# Patient Record
Sex: FEMALE | Race: WHITE | NOT HISPANIC OR LATINO | Employment: FULL TIME | URBAN - METROPOLITAN AREA
[De-identification: names, ages, dates, MRNs, and addresses within clinical notes are randomized per-mention and may not be internally consistent; named-entity substitution may affect disease eponyms.]

---

## 2016-08-09 LAB
EXTERNAL ABO GROUPING: NORMAL
EXTERNAL ANTIBODY SCREEN: NEGATIVE
EXTERNAL HEPATITIS B SURFACE ANTIGEN: NEGATIVE
EXTERNAL RH FACTOR: POSITIVE
EXTERNAL RUBELLA QUALITATIVE: NORMAL
EXTERNAL SYPHILIS RPR SCREEN: NORMAL
HIV1 AB SPEC QL IA.RAPID: NEGATIVE

## 2017-01-22 ENCOUNTER — HOSPITAL ENCOUNTER (OUTPATIENT)
Facility: HOSPITAL | Age: 42
Setting detail: OBSERVATION
Discharge: HOME | End: 2017-01-22
Attending: OBSTETRICS & GYNECOLOGY | Admitting: OBSTETRICS & GYNECOLOGY

## 2017-01-22 VITALS
BODY MASS INDEX: 32.76 KG/M2 | WEIGHT: 178 LBS | DIASTOLIC BLOOD PRESSURE: 77 MMHG | HEART RATE: 69 BPM | SYSTOLIC BLOOD PRESSURE: 127 MMHG | TEMPERATURE: 99 F | HEIGHT: 62 IN | RESPIRATION RATE: 20 BRPM | OXYGEN SATURATION: 97 %

## 2017-01-22 LAB
EXPIRATION DATE: NORMAL
Lab: NORMAL
PROT UR STRIP-MCNC: NEGATIVE MG/DL

## 2017-01-22 PROCEDURE — G0378 HOSPITAL OBSERVATION PER HR: HCPCS

## 2017-01-22 PROCEDURE — 96372 THER/PROPH/DIAG INJ SC/IM: CPT

## 2017-01-22 PROCEDURE — 25010000002 BETAMETHASONE ACET & SOD PHOS PER 4 MG: Performed by: OBSTETRICS & GYNECOLOGY

## 2017-01-22 PROCEDURE — 59025 FETAL NON-STRESS TEST: CPT

## 2017-01-22 RX ORDER — MAGNESIUM GLUCONATE 27 MG(500)
400 TABLET ORAL DAILY
COMMUNITY
End: 2017-01-27 | Stop reason: HOSPADM

## 2017-01-22 RX ORDER — MELATONIN
2000 DAILY
COMMUNITY

## 2017-01-22 RX ORDER — BETAMETHASONE SODIUM PHOSPHATE AND BETAMETHASONE ACETATE 3; 3 MG/ML; MG/ML
12 INJECTION, SUSPENSION INTRA-ARTICULAR; INTRALESIONAL; INTRAMUSCULAR; SOFT TISSUE ONCE
Status: COMPLETED | OUTPATIENT
Start: 2017-01-22 | End: 2017-01-22

## 2017-01-22 RX ORDER — URSODIOL 250 MG/1
500 TABLET, FILM COATED ORAL 3 TIMES DAILY
COMMUNITY

## 2017-01-22 RX ADMIN — BETAMETHASONE SODIUM PHOSPHATE AND BETAMETHASONE ACETATE 12 MG: 3; 3 INJECTION, SUSPENSION INTRA-ARTICULAR; INTRALESIONAL; INTRAMUSCULAR at 13:38

## 2017-01-22 NOTE — IP AVS SNAPSHOT
AFTER VISIT SUMMARY             Khadra Royal           About your hospitalization     You were discharged on:  January 22, 2017 You last received care in the:  [facility] LABOR DELIVERY     Unit phone number:  [phone]       Medications    If you or your caregiver advised us that you are currently taking a medication and that medication is marked below as “Resume”, this simply indicates that we have reviewed those medications to make sure our new therapy recommendations do not interfere.  If you have concerns about medications other than those new ones which we are prescribing today, please consult the physician who prescribed them (or your primary physician).  Our review of your home medications is not meant to indicate that we are directing their use.             Your Medications      ASK your doctor about these medications           Morning Noon Evening Bedtime As Needed    aspirin 81 MG chewable tablet   Chew 81 mg Daily.                                cholecalciferol 1000 UNITS tablet   Take 2,000 Units by mouth Daily.   Commonly known as:  VITAMIN D3                                DHA ALGAL-900 300 MG capsule   Take  by mouth.   Generic drug:  Docosahexaenoic Acid                                docusate sodium 100 MG capsule   Take 100 mg by mouth 2 (Two) Times a Day.   Commonly known as:  COLACE                                * insulin detemir 100 UNIT/ML injection   Inject 20 Units under the skin Daily.   Commonly known as:  LEVEMIR                                * insulin detemir 100 UNIT/ML injection   Inject 50 Units under the skin Every Night.   Commonly known as:  LEVEMIR                                magnesium gluconate 500 MG tablet   Take 400 mg by mouth Daily.   Commonly known as:  MAGONATE                                prenatal (CLASSIC) vitamin 28-0.8 MG tablet tablet   Take 1 tablet by mouth Daily.                                ursodiol 250 MG tablet   Take 500 mg by  mouth 3 (Three) Times a Day.   Commonly known as:  ACTIGALL                                * Notice:  This list has 2 medication(s) that are the same as other medications prescribed for you. Read the directions carefully, and ask your doctor or other care provider to review them with you.             Instructions for After Discharge        Scheduled Appointments     Jan 25, 2017  7:00 AM EST   LABOR INDUCTION with BLAINE LD INDUCTION ROOM   [facility] LABOR AND DELIVERY ([place])    [address] [facility] [id number] [phone]               Relevant Prenatal Information          Facts About Your Prenatal Visit (All Dating Information Is Approximate)     Due Date How Far Along Am I?          2/18/2017 36 weeks 1 days        Vitals     Blood Pressure                   131/72            Summary of Your Hospitalization        Reason for Hospitalization     Your primary diagnosis was:  Not on File    Your diagnoses also included:  Pregnancy      Care Providers     Provider Service Role Specialty    Krystin Acosta MD -- Attending Provider Obstetrics and Gynecology      Your Allergies  Date Reviewed: 1/22/2017    No active allergies      [employer] Signup     Our records indicate that you have an active [employer] account.    You can view your After Visit Summary by going to [employer] and logging in with your [employer] username and password.  If you don't have a [employer] username and password but a parent or guardian has access to your record, the parent or guardian should login with their own [employer] username and password and access your record to view the After Visit Summary.    If you have questions, you can email [email or web address] or call [phone] to talk to our [employer] staff.  Remember, [employer] is NOT to be used for urgent needs.  For medical emergencies, dial 911.          Information Regarding Fetal Kick Counts     Fetal Movement Counts  Patient Name:  __________________________________________________   Patient Due Date: ____________________  Performing a fetal movement count is highly recommended in high-risk pregnancies, but it is good for every pregnant woman to do. Your caregiver may ask you to start counting fetal movements at 28 weeks of the pregnancy. Fetal movements often increase:  · After eating a full meal.  · After physical activity.  · After eating or drinking something sweet or cold.  · At rest.  Pay attention to when you feel the baby is most active. This will help you notice a pattern of your baby's sleep and wake cycles and what factors contribute to an increase in fetal movement. It is important to perform a fetal movement count at the same time each day when your baby is normally most active.    HOW TO COUNT FETAL MOVEMENTS  1. Find a quiet and comfortable area to sit or lie down on your left side. Lying on your left side provides the best blood and oxygen circulation to your baby.  2. Write down the day and time on a sheet of paper or in a journal.  3. Start counting kicks, flutters, swishes, rolls, or jabs in a 2 hour period. You should feel at least 10 movements within 2 hours.  4. If you do not feel 10 movements in 2 hours, wait 2-3 hours and count again. Look for a change in the pattern or not enough counts in 2 hours.  SEEK MEDICAL CARE IF:  · You feel less than 10 counts in 2 hours, tried twice.  · There is no movement in over an hour.  · The pattern is changing or taking longer each day to reach 10 counts in 2 hours.  · You feel the baby is not moving as he or she usually does.    Date  Movements  Start Time  Finish Time    Date  Movements  Start Time  Finish Time    Date  Movements  Start Time  Finish Time    Date  Movements  Start Time  Finish Time    Date  Movements  Start Time  Finish Time    Date  Movements  Start Time  Finish Time      Date  Movements  Start Time  Finish Time    Date  Movements  Start Time  Finish Time    Date   Movements  Start Time  Finish Time    Date  Movements  Start Time  Finish Time    Date  Movements  Start Time  Finish Time    Date  Movements  Start Time  Finish Time      Date  Movements  Start Time  Finish Time    Date  Movements  Start Time  Finish Time    Date  Movements  Start Time  Finish Time    Date  Movements  Start Time  Finish Time    Date  Movements  Start Time  Finish Time    Date  Movements  Start Time  Finish Time    Date  Movements  Start Time  Finish Time    Date  Movements  Start Time  Finish Time    Date  Movements  Start Time  Finish Time    Date  Movements  Start Time  Finish Time    Date  Movements  Start Time  Finish Time    Date  Movements  Start Time  Finish Time      Document Released: 01/17/2008   Document Revised: 12/04/2013   Document Reviewed: 10/14/2013    ExitCare® Patient Information ©2015 [employer], [facility]. This information is not intended to replace advice given to you by your health care provider. Make sure you discuss any questions you have with your health care provider.            More Information      Third Trimester of Pregnancy  The third trimester is from week 29 through week 42, months 7 through 9. The third trimester is a time when the fetus is growing rapidly. At the end of the ninth month, the fetus is about 20 inches in length and weighs 6-10 pounds.   BODY CHANGES  Your body goes through many changes during pregnancy. The changes vary from woman to woman.   · Your weight will continue to increase. You can expect to gain 25-35 pounds (11-16 kg) by the end of the pregnancy.  · You may begin to get stretch marks on your hips, abdomen, and breasts.  · You may urinate more often because the fetus is moving lower into your pelvis and pressing on your bladder.  · You may develop or continue to have heartburn as a result of your pregnancy.  · You may develop constipation because certain hormones are causing the muscles that push waste through your intestines to slow down.  · You  "may develop hemorrhoids or swollen, bulging veins (varicose veins).  · You may have pelvic pain because of the weight gain and pregnancy hormones relaxing your joints between the bones in your pelvis. Backaches may result from overexertion of the muscles supporting your posture.  · You may have changes in your hair. These can include thickening of your hair, rapid growth, and changes in texture. Some women also have hair loss during or after pregnancy, or hair that feels dry or thin. Your hair will most likely return to normal after your baby is born.  · Your breasts will continue to grow and be tender. A yellow discharge may leak from your breasts called colostrum.  · Your belly button may stick out.  · You may feel short of breath because of your expanding uterus.  · You may notice the fetus \"dropping,\" or moving lower in your abdomen.  · You may have a bloody mucus discharge. This usually occurs a few days to a week before labor begins.  · Your cervix becomes thin and soft (effaced) near your due date.  WHAT TO EXPECT AT YOUR PRENATAL EXAMS   You will have prenatal exams every 2 weeks until week 36. Then, you will have weekly prenatal exams. During a routine prenatal visit:  · You will be weighed to make sure you and the fetus are growing normally.  · Your blood pressure is taken.  · Your abdomen will be measured to track your baby's growth.  · The fetal heartbeat will be listened to.  · Any test results from the previous visit will be discussed.  · You may have a cervical check near your due date to see if you have effaced.  At around 36 weeks, your caregiver will check your cervix. At the same time, your caregiver will also perform a test on the secretions of the vaginal tissue. This test is to determine if a type of bacteria, Group B streptococcus, is present. Your caregiver will explain this further.  Your caregiver may ask you:  · What your birth plan is.  · How you are feeling.  · If you are feeling the " baby move.  · If you have had any abnormal symptoms, such as leaking fluid, bleeding, severe headaches, or abdominal cramping.  · If you are using any tobacco products, including cigarettes, chewing tobacco, and electronic cigarettes.  · If you have any questions.  Other tests or screenings that may be performed during your third trimester include:  · Blood tests that check for low iron levels (anemia).  · Fetal testing to check the health, activity level, and growth of the fetus. Testing is done if you have certain medical conditions or if there are problems during the pregnancy.  · HIV (human immunodeficiency virus) testing. If you are at high risk, you may be screened for HIV during your third trimester of pregnancy.  FALSE LABOR  You may feel small, irregular contractions that eventually go away. These are called Danny Mccabe contractions, or false labor. Contractions may last for hours, days, or even weeks before true labor sets in. If contractions come at regular intervals, intensify, or become painful, it is best to be seen by your caregiver.   SIGNS OF LABOR   · Menstrual-like cramps.  · Contractions that are 5 minutes apart or less.  · Contractions that start on the top of the uterus and spread down to the lower abdomen and back.  · A sense of increased pelvic pressure or back pain.  · A watery or bloody mucus discharge that comes from the vagina.  If you have any of these signs before the 37th week of pregnancy, call your caregiver right away. You need to go to the hospital to get checked immediately.  HOME CARE INSTRUCTIONS   · Avoid all smoking, herbs, alcohol, and unprescribed drugs. These chemicals affect the formation and growth of the baby.  · Do not use any tobacco products, including cigarettes, chewing tobacco, and electronic cigarettes. If you need help quitting, ask your health care provider. You may receive counseling support and other resources to help you quit.  · Follow your caregiver's  instructions regarding medicine use. There are medicines that are either safe or unsafe to take during pregnancy.  · Exercise only as directed by your caregiver. Experiencing uterine cramps is a good sign to stop exercising.  · Continue to eat regular, healthy meals.  · Wear a good support bra for breast tenderness.  · Do not use hot tubs, steam rooms, or saunas.  · Wear your seat belt at all times when driving.  · Avoid raw meat, uncooked cheese, cat litter boxes, and soil used by cats. These carry germs that can cause birth defects in the baby.  · Take your prenatal vitamins.  · Take 5263-8387 mg of calcium daily starting at the 20th week of pregnancy until you deliver your baby.  · Try taking a stool softener (if your caregiver approves) if you develop constipation. Eat more high-fiber foods, such as fresh vegetables or fruit and whole grains. Drink plenty of fluids to keep your urine clear or pale yellow.  · Take warm sitz baths to soothe any pain or discomfort caused by hemorrhoids. Use hemorrhoid cream if your caregiver approves.  · If you develop varicose veins, wear support hose. Elevate your feet for 15 minutes, 3-4 times a day. Limit salt in your diet.  · Avoid heavy lifting, wear low heal shoes, and practice good posture.  · Rest a lot with your legs elevated if you have leg cramps or low back pain.  · Visit your dentist if you have not gone during your pregnancy. Use a soft toothbrush to brush your teeth and be gentle when you floss.  · A sexual relationship may be continued unless your caregiver directs you otherwise.  · Do not travel far distances unless it is absolutely necessary and only with the approval of your caregiver.  · Take prenatal classes to understand, practice, and ask questions about the labor and delivery.  · Make a trial run to the hospital.  · Pack your hospital bag.  · Prepare the baby's nursery.  · Continue to go to all your prenatal visits as directed by your caregiver.  SEEK  MEDICAL CARE IF:  · You are unsure if you are in labor or if your water has broken.  · You have dizziness.  · You have mild pelvic cramps, pelvic pressure, or nagging pain in your abdominal area.  · You have persistent nausea, vomiting, or diarrhea.  · You have a bad smelling vaginal discharge.  · You have pain with urination.  SEEK IMMEDIATE MEDICAL CARE IF:   · You have a fever.  · You are leaking fluid from your vagina.  · You have spotting or bleeding from your vagina.  · You have severe abdominal cramping or pain.  · You have rapid weight loss or gain.  · You have shortness of breath with chest pain.  · You notice sudden or extreme swelling of your face, hands, ankles, feet, or legs.  · You have not felt your baby move in over an hour.  · You have severe headaches that do not go away with medicine.  · You have vision changes.     This information is not intended to replace advice given to you by your health care provider. Make sure you discuss any questions you have with your health care provider.     Document Released: 12/12/2002 Document Revised: 01/08/2016 Document Reviewed: 02/18/2014  [employer] Interactive Patient Education ©2016 [employer]          Nonstress Test  The nonstress test is a procedure that monitors the fetus's heartbeat. The test will monitor the heartbeat when the fetus is at rest and while the fetus is moving. In a healthy fetus, there will be an increase in fetal heart rate when the fetus moves or kicks. The heart rate will decrease at rest. This test helps determine if the fetus is healthy. Your health care provider will look at a number of patterns in the heart rate tracing to make sure your baby is thriving. If there is concern, your health care provider may order additional tests or may suggest another course of action. This test is often done in the third trimester and can help determine if an early delivery is needed and safe. Common reasons to have this test are:  · You are past  your due date.  · You have a high-risk pregnancy.  · You are feeling less movement than normal.  · You have lost a pregnancy in the past.  · Your health care provider suspects fetal growth problems.  · You have too much or too little amniotic fluid.  BEFORE THE PROCEDURE  · Eat a meal right before the test or as directed by your health care provider. Food may help stimulate fetal movements.  · Use the restroom right before the test.  PROCEDURE  · Two belts will be placed around your abdomen. These belts have monitors attached to them. One records the fetal heart rate and the other records uterine contractions.  · You may be asked to lie down on your side or to stay sitting upright.  · You may be given a button to press when you feel movement.  · The fetal heartbeat is listened to and watched on a screen. The heartbeat is recorded on a sheet of paper.  · If the fetus seems to be sleeping, you may be asked to drink some juice or soda, gently press your abdomen, or make some noise to wake the fetus.  AFTER THE PROCEDURE   Your health care provider will discuss the test results with you and make recommendations for the near future.     This information is not intended to replace advice given to you by your health care provider. Make sure you discuss any questions you have with your health care provider.     Document Released: 12/08/2003 Document Revised: 01/08/2016 Document Reviewed: 01/21/2014  [employer] Interactive Patient Education ©2016 [employer]            SYMPTOMS OF A STROKE    Call 911 or have someone take you to the Emergency Department if you have any of the following:    · Sudden numbness or weakness of your face, arm or leg especially on one side of the body  · Sudden confusion, diffiiculty speaking or trouble understanding   · Changes in your vision or loss of sight in one eye  · Sudden severe headache with no known cause  · sudden dizziness, trouble walking, loss of balance or coordination    It is  important to seek emergency care right away if you have further stroke symptoms. If you get emergency help quickly, the powerful clot-dissolving medicines can reduce the disabilities caused by a stroke.     For more information:    American Stroke Association  2-064-1-STROKE  www.strokeassociation.org           IF YOU SMOKE OR USE TOBACCO PLEASE READ THE FOLLOWING:    Why is smoking bad for me?  Smoking increases the risk of heart disease, lung disease, vascular disease, stroke, and cancer.     If you smoke, STOP!    If you would like more information on quitting smoking, please visit the [employer] website: www.[employer]/healthier-together/smoke   This link will provide additional resources including the QUIT line and the Beat the Pack support groups.     For more information:    American Cancer Society  ([phone] 727-7479    American Heart Association  1-995.857.3458               YOU ARE THE MOST IMPORTANT FACTOR IN YOUR RECOVERY.     Follow all instructions carefully.     I have reviewed my discharge instructions with my nurse, including the following information, if applicable:     Information about my illness and diagnosis   Follow up appointments (including lab draws)   Wound Care   Equipment Needs   Medications (new and continuing) along with side effects   Preventative information such as vaccines and smoking cessations   Diet   Pain   I know when to contact my Doctor's office or seek emergency care      I want my nurse to describe the side effects of my medications: YES NO   If the answer is no, I understand the side effects of my medications: YES NO   My nurse described the side effects of my medications in a way that I could understand: YES NO   I have taken my personal belongings and my own medications with me at discharge: YES NO            Should a home visit be schedule with you:  a notification from your provider will be made prior to the visit.  If you have any questions  or concerns about the visit, contact your provider.      I have received this information and my questions have been answered. I have discussed any concerns I see with this plan with the nurse or physician. I understand these instructions.    Signature of Patient or Responsible Person: _____________________________________    Date: _________________  Time: __________________    Signature of Healthcare Provider: _______________________________________  Date: _________________  Time: __________________          Additional Information     I have reviewed the patient condition and status with the provider and a discharge order was obtained.  I hereby state that the patient has been examined and observed for a reasonable period of time and I certify that the patient is in false labor.    RN Name ___________________________________________    Date and Time _______________________________________

## 2017-01-22 NOTE — IP AVS SNAPSHOT
AFTER VISIT SUMMARY             Khadra Royal           About your hospitalization     You were discharged on:  January 22, 2017 You last received care in the:  [facility] LABOR DELIVERY     Unit phone number:  [phone]       Medications    If you or your caregiver advised us that you are currently taking a medication and that medication is marked below as “Resume”, this simply indicates that we have reviewed those medications to make sure our new therapy recommendations do not interfere.  If you have concerns about medications other than those new ones which we are prescribing today, please consult the physician who prescribed them (or your primary physician).  Our review of your home medications is not meant to indicate that we are directing their use.             Your Medications      ASK your doctor about these medications           Morning Noon Evening Bedtime As Needed    aspirin 81 MG chewable tablet   Chew 81 mg Daily.                                cholecalciferol 1000 UNITS tablet   Take 2,000 Units by mouth Daily.   Commonly known as:  VITAMIN D3                                DHA ALGAL-900 300 MG capsule   Take  by mouth.   Generic drug:  Docosahexaenoic Acid                                docusate sodium 100 MG capsule   Take 100 mg by mouth 2 (Two) Times a Day.   Commonly known as:  COLACE                                * insulin detemir 100 UNIT/ML injection   Inject 20 Units under the skin Daily.   Commonly known as:  LEVEMIR                                * insulin detemir 100 UNIT/ML injection   Inject 50 Units under the skin Every Night.   Commonly known as:  LEVEMIR                                magnesium gluconate 500 MG tablet   Take 400 mg by mouth Daily.   Commonly known as:  MAGONATE                                prenatal (CLASSIC) vitamin 28-0.8 MG tablet tablet   Take 1 tablet by mouth Daily.                                ursodiol 250 MG tablet   Take 500 mg by  mouth 3 (Three) Times a Day.   Commonly known as:  ACTIGALL                                * Notice:  This list has 2 medication(s) that are the same as other medications prescribed for you. Read the directions carefully, and ask your doctor or other care provider to review them with you.             Instructions for After Discharge        Scheduled Appointments     Jan 25, 2017  7:00 AM EST   LABOR INDUCTION with BLAINE LD INDUCTION ROOM   [facility] LABOR AND DELIVERY ([place])    [address] [facility] [id number] [phone]               Relevant Prenatal Information          Facts About Your Prenatal Visit (All Dating Information Is Approximate)     Due Date How Far Along Am I?          2/18/2017 36 weeks 1 days        Vitals     Blood Pressure                   131/72            Summary of Your Hospitalization        Reason for Hospitalization     Your primary diagnosis was:  Not on File    Your diagnoses also included:  Pregnancy      Care Providers     Provider Service Role Specialty    Krystin Acosta MD -- Attending Provider Obstetrics and Gynecology      Your Allergies  Date Reviewed: 1/22/2017    No active allergies      SmartShoot Signup     Our records indicate that you have an active [employer] account.    You can view your After Visit Summary by going to [employer] and logging in with your SmartShoot username and password.  If you don't have a SmartShoot username and password but a parent or guardian has access to your record, the parent or guardian should login with their own SmartShoot username and password and access your record to view the After Visit Summary.    If you have questions, you can email [email or web address] or call [phone] to talk to our SmartShoot staff.  Remember, SmartShoot is NOT to be used for urgent needs.  For medical emergencies, dial 911.          Information Regarding Fetal Kick Counts     Fetal Movement Counts  Patient Name:  __________________________________________________   Patient Due Date: ____________________  Performing a fetal movement count is highly recommended in high-risk pregnancies, but it is good for every pregnant woman to do. Your caregiver may ask you to start counting fetal movements at 28 weeks of the pregnancy. Fetal movements often increase:  · After eating a full meal.  · After physical activity.  · After eating or drinking something sweet or cold.  · At rest.  Pay attention to when you feel the baby is most active. This will help you notice a pattern of your baby's sleep and wake cycles and what factors contribute to an increase in fetal movement. It is important to perform a fetal movement count at the same time each day when your baby is normally most active.    HOW TO COUNT FETAL MOVEMENTS  1. Find a quiet and comfortable area to sit or lie down on your left side. Lying on your left side provides the best blood and oxygen circulation to your baby.  2. Write down the day and time on a sheet of paper or in a journal.  3. Start counting kicks, flutters, swishes, rolls, or jabs in a 2 hour period. You should feel at least 10 movements within 2 hours.  4. If you do not feel 10 movements in 2 hours, wait 2-3 hours and count again. Look for a change in the pattern or not enough counts in 2 hours.  SEEK MEDICAL CARE IF:  · You feel less than 10 counts in 2 hours, tried twice.  · There is no movement in over an hour.  · The pattern is changing or taking longer each day to reach 10 counts in 2 hours.  · You feel the baby is not moving as he or she usually does.    Date  Movements  Start Time  Finish Time    Date  Movements  Start Time  Finish Time    Date  Movements  Start Time  Finish Time    Date  Movements  Start Time  Finish Time    Date  Movements  Start Time  Finish Time    Date  Movements  Start Time  Finish Time      Date  Movements  Start Time  Finish Time    Date  Movements  Start Time  Finish Time    Date   Movements  Start Time  Finish Time    Date  Movements  Start Time  Finish Time    Date  Movements  Start Time  Finish Time    Date  Movements  Start Time  Finish Time      Date  Movements  Start Time  Finish Time    Date  Movements  Start Time  Finish Time    Date  Movements  Start Time  Finish Time    Date  Movements  Start Time  Finish Time    Date  Movements  Start Time  Finish Time    Date  Movements  Start Time  Finish Time    Date  Movements  Start Time  Finish Time    Date  Movements  Start Time  Finish Time    Date  Movements  Start Time  Finish Time    Date  Movements  Start Time  Finish Time    Date  Movements  Start Time  Finish Time    Date  Movements  Start Time  Finish Time      Document Released: 01/17/2008   Document Revised: 12/04/2013   Document Reviewed: 10/14/2013    ExitCare® Patient Information ©2015 [employer], [facility]. This information is not intended to replace advice given to you by your health care provider. Make sure you discuss any questions you have with your health care provider.            More Information      Third Trimester of Pregnancy  The third trimester is from week 29 through week 42, months 7 through 9. The third trimester is a time when the fetus is growing rapidly. At the end of the ninth month, the fetus is about 20 inches in length and weighs 6-10 pounds.   BODY CHANGES  Your body goes through many changes during pregnancy. The changes vary from woman to woman.   · Your weight will continue to increase. You can expect to gain 25-35 pounds (11-16 kg) by the end of the pregnancy.  · You may begin to get stretch marks on your hips, abdomen, and breasts.  · You may urinate more often because the fetus is moving lower into your pelvis and pressing on your bladder.  · You may develop or continue to have heartburn as a result of your pregnancy.  · You may develop constipation because certain hormones are causing the muscles that push waste through your intestines to slow down.  · You  "may develop hemorrhoids or swollen, bulging veins (varicose veins).  · You may have pelvic pain because of the weight gain and pregnancy hormones relaxing your joints between the bones in your pelvis. Backaches may result from overexertion of the muscles supporting your posture.  · You may have changes in your hair. These can include thickening of your hair, rapid growth, and changes in texture. Some women also have hair loss during or after pregnancy, or hair that feels dry or thin. Your hair will most likely return to normal after your baby is born.  · Your breasts will continue to grow and be tender. A yellow discharge may leak from your breasts called colostrum.  · Your belly button may stick out.  · You may feel short of breath because of your expanding uterus.  · You may notice the fetus \"dropping,\" or moving lower in your abdomen.  · You may have a bloody mucus discharge. This usually occurs a few days to a week before labor begins.  · Your cervix becomes thin and soft (effaced) near your due date.  WHAT TO EXPECT AT YOUR PRENATAL EXAMS   You will have prenatal exams every 2 weeks until week 36. Then, you will have weekly prenatal exams. During a routine prenatal visit:  · You will be weighed to make sure you and the fetus are growing normally.  · Your blood pressure is taken.  · Your abdomen will be measured to track your baby's growth.  · The fetal heartbeat will be listened to.  · Any test results from the previous visit will be discussed.  · You may have a cervical check near your due date to see if you have effaced.  At around 36 weeks, your caregiver will check your cervix. At the same time, your caregiver will also perform a test on the secretions of the vaginal tissue. This test is to determine if a type of bacteria, Group B streptococcus, is present. Your caregiver will explain this further.  Your caregiver may ask you:  · What your birth plan is.  · How you are feeling.  · If you are feeling the " baby move.  · If you have had any abnormal symptoms, such as leaking fluid, bleeding, severe headaches, or abdominal cramping.  · If you are using any tobacco products, including cigarettes, chewing tobacco, and electronic cigarettes.  · If you have any questions.  Other tests or screenings that may be performed during your third trimester include:  · Blood tests that check for low iron levels (anemia).  · Fetal testing to check the health, activity level, and growth of the fetus. Testing is done if you have certain medical conditions or if there are problems during the pregnancy.  · HIV (human immunodeficiency virus) testing. If you are at high risk, you may be screened for HIV during your third trimester of pregnancy.  FALSE LABOR  You may feel small, irregular contractions that eventually go away. These are called Danny Mccabe contractions, or false labor. Contractions may last for hours, days, or even weeks before true labor sets in. If contractions come at regular intervals, intensify, or become painful, it is best to be seen by your caregiver.   SIGNS OF LABOR   · Menstrual-like cramps.  · Contractions that are 5 minutes apart or less.  · Contractions that start on the top of the uterus and spread down to the lower abdomen and back.  · A sense of increased pelvic pressure or back pain.  · A watery or bloody mucus discharge that comes from the vagina.  If you have any of these signs before the 37th week of pregnancy, call your caregiver right away. You need to go to the hospital to get checked immediately.  HOME CARE INSTRUCTIONS   · Avoid all smoking, herbs, alcohol, and unprescribed drugs. These chemicals affect the formation and growth of the baby.  · Do not use any tobacco products, including cigarettes, chewing tobacco, and electronic cigarettes. If you need help quitting, ask your health care provider. You may receive counseling support and other resources to help you quit.  · Follow your caregiver's  instructions regarding medicine use. There are medicines that are either safe or unsafe to take during pregnancy.  · Exercise only as directed by your caregiver. Experiencing uterine cramps is a good sign to stop exercising.  · Continue to eat regular, healthy meals.  · Wear a good support bra for breast tenderness.  · Do not use hot tubs, steam rooms, or saunas.  · Wear your seat belt at all times when driving.  · Avoid raw meat, uncooked cheese, cat litter boxes, and soil used by cats. These carry germs that can cause birth defects in the baby.  · Take your prenatal vitamins.  · Take 4192-4098 mg of calcium daily starting at the 20th week of pregnancy until you deliver your baby.  · Try taking a stool softener (if your caregiver approves) if you develop constipation. Eat more high-fiber foods, such as fresh vegetables or fruit and whole grains. Drink plenty of fluids to keep your urine clear or pale yellow.  · Take warm sitz baths to soothe any pain or discomfort caused by hemorrhoids. Use hemorrhoid cream if your caregiver approves.  · If you develop varicose veins, wear support hose. Elevate your feet for 15 minutes, 3-4 times a day. Limit salt in your diet.  · Avoid heavy lifting, wear low heal shoes, and practice good posture.  · Rest a lot with your legs elevated if you have leg cramps or low back pain.  · Visit your dentist if you have not gone during your pregnancy. Use a soft toothbrush to brush your teeth and be gentle when you floss.  · A sexual relationship may be continued unless your caregiver directs you otherwise.  · Do not travel far distances unless it is absolutely necessary and only with the approval of your caregiver.  · Take prenatal classes to understand, practice, and ask questions about the labor and delivery.  · Make a trial run to the hospital.  · Pack your hospital bag.  · Prepare the baby's nursery.  · Continue to go to all your prenatal visits as directed by your caregiver.  SEEK  MEDICAL CARE IF:  · You are unsure if you are in labor or if your water has broken.  · You have dizziness.  · You have mild pelvic cramps, pelvic pressure, or nagging pain in your abdominal area.  · You have persistent nausea, vomiting, or diarrhea.  · You have a bad smelling vaginal discharge.  · You have pain with urination.  SEEK IMMEDIATE MEDICAL CARE IF:   · You have a fever.  · You are leaking fluid from your vagina.  · You have spotting or bleeding from your vagina.  · You have severe abdominal cramping or pain.  · You have rapid weight loss or gain.  · You have shortness of breath with chest pain.  · You notice sudden or extreme swelling of your face, hands, ankles, feet, or legs.  · You have not felt your baby move in over an hour.  · You have severe headaches that do not go away with medicine.  · You have vision changes.     This information is not intended to replace advice given to you by your health care provider. Make sure you discuss any questions you have with your health care provider.     Document Released: 12/12/2002 Document Revised: 01/08/2016 Document Reviewed: 02/18/2014  [employer] Interactive Patient Education ©2016 [employer]          Nonstress Test  The nonstress test is a procedure that monitors the fetus's heartbeat. The test will monitor the heartbeat when the fetus is at rest and while the fetus is moving. In a healthy fetus, there will be an increase in fetal heart rate when the fetus moves or kicks. The heart rate will decrease at rest. This test helps determine if the fetus is healthy. Your health care provider will look at a number of patterns in the heart rate tracing to make sure your baby is thriving. If there is concern, your health care provider may order additional tests or may suggest another course of action. This test is often done in the third trimester and can help determine if an early delivery is needed and safe. Common reasons to have this test are:  · You are past  your due date.  · You have a high-risk pregnancy.  · You are feeling less movement than normal.  · You have lost a pregnancy in the past.  · Your health care provider suspects fetal growth problems.  · You have too much or too little amniotic fluid.  BEFORE THE PROCEDURE  · Eat a meal right before the test or as directed by your health care provider. Food may help stimulate fetal movements.  · Use the restroom right before the test.  PROCEDURE  · Two belts will be placed around your abdomen. These belts have monitors attached to them. One records the fetal heart rate and the other records uterine contractions.  · You may be asked to lie down on your side or to stay sitting upright.  · You may be given a button to press when you feel movement.  · The fetal heartbeat is listened to and watched on a screen. The heartbeat is recorded on a sheet of paper.  · If the fetus seems to be sleeping, you may be asked to drink some juice or soda, gently press your abdomen, or make some noise to wake the fetus.  AFTER THE PROCEDURE   Your health care provider will discuss the test results with you and make recommendations for the near future.     This information is not intended to replace advice given to you by your health care provider. Make sure you discuss any questions you have with your health care provider.     Document Released: 12/08/2003 Document Revised: 01/08/2016 Document Reviewed: 01/21/2014  [employer] Interactive Patient Education ©2016 [employer]            SYMPTOMS OF A STROKE    Call 911 or have someone take you to the Emergency Department if you have any of the following:    · Sudden numbness or weakness of your face, arm or leg especially on one side of the body  · Sudden confusion, diffiiculty speaking or trouble understanding   · Changes in your vision or loss of sight in one eye  · Sudden severe headache with no known cause  · sudden dizziness, trouble walking, loss of balance or coordination    It is  important to seek emergency care right away if you have further stroke symptoms. If you get emergency help quickly, the powerful clot-dissolving medicines can reduce the disabilities caused by a stroke.     For more information:    American Stroke Association  0-938-7-STROKE  www.strokeassociation.org           IF YOU SMOKE OR USE TOBACCO PLEASE READ THE FOLLOWING:    Why is smoking bad for me?  Smoking increases the risk of heart disease, lung disease, vascular disease, stroke, and cancer.     If you smoke, STOP!    If you would like more information on quitting smoking, please visit the [employer] website: [email or web address]   This link will provide additional resources including the QUIT line and the Beat the Pack support groups.     For more information:    American Cancer Society  ([phone] 135-6246    American Heart Association  1-119.429.9586               YOU ARE THE MOST IMPORTANT FACTOR IN YOUR RECOVERY.     Follow all instructions carefully.     I have reviewed my discharge instructions with my nurse, including the following information, if applicable:     Information about my illness and diagnosis   Follow up appointments (including lab draws)   Wound Care   Equipment Needs   Medications (new and continuing) along with side effects   Preventative information such as vaccines and smoking cessations   Diet   Pain   I know when to contact my Doctor's office or seek emergency care      I want my nurse to describe the side effects of my medications: YES NO   If the answer is no, I understand the side effects of my medications: YES NO   My nurse described the side effects of my medications in a way that I could understand: YES NO   I have taken my personal belongings and my own medications with me at discharge: YES NO            Should a home visit be schedule with you:  a notification from your provider will be made prior to the visit.  If you have any questions  or concerns about the visit, contact your provider.      I have received this information and my questions have been answered. I have discussed any concerns I see with this plan with the nurse or physician. I understand these instructions.    Signature of Patient or Responsible Person: _____________________________________    Date: _________________  Time: __________________    Signature of Healthcare Provider: _______________________________________  Date: _________________  Time: __________________

## 2017-01-22 NOTE — NON STRESS TEST
Khadra Royal, a  at 36w1d with an AMANDA of 2017, by [employer], was seen at [facility] LABOR DELIVERY for a nonstress test.    Chief Complaint   Patient presents with   • Non-stress Test     Pt. here for BMZ injection and NST       Interpretation A  Nonstress Test Interpretation A: Reactive (17 1352 : Val Polanco RN)

## 2017-01-25 ENCOUNTER — HOSPITAL ENCOUNTER (INPATIENT)
Dept: LABOR AND DELIVERY | Facility: HOSPITAL | Age: 42
LOS: 2 days | Discharge: HOME | End: 2017-01-27
Attending: OBSTETRICS & GYNECOLOGY | Admitting: OBSTETRICS & GYNECOLOGY

## 2017-01-25 ENCOUNTER — ANESTHESIA EVENT (OUTPATIENT)
Dept: LABOR AND DELIVERY | Facility: HOSPITAL | Age: 42
End: 2017-01-25

## 2017-01-25 ENCOUNTER — ANESTHESIA (OUTPATIENT)
Dept: LABOR AND DELIVERY | Facility: HOSPITAL | Age: 42
End: 2017-01-25

## 2017-01-25 LAB
ABO GROUP BLD: NORMAL
ALBUMIN SERPL-MCNC: 3 G/DL (ref 3.5–5.2)
ALBUMIN/GLOB SERPL: 0.9 G/DL
ALP SERPL-CCNC: 259 U/L (ref 39–117)
ALT SERPL W P-5'-P-CCNC: 91 U/L (ref 1–33)
ANION GAP SERPL CALCULATED.3IONS-SCNC: 14.9 MMOL/L
AST SERPL-CCNC: 43 U/L (ref 1–32)
ATMOSPHERIC PRESS: 737.8 MMHG
BASE EXCESS BLDCOA CALC-SCNC: -5.7 MMOL/L
BASOPHILS # BLD AUTO: 0.02 10*3/MM3 (ref 0–0.2)
BASOPHILS NFR BLD AUTO: 0.3 % (ref 0–1.5)
BDY SITE: ABNORMAL
BILIRUB SERPL-MCNC: 0.4 MG/DL (ref 0.1–1.2)
BLD GP AB SCN SERPL QL: NEGATIVE
BUN BLD-MCNC: 13 MG/DL (ref 6–20)
BUN/CREAT SERPL: 25.5 (ref 7–25)
CALCIUM SPEC-SCNC: 9.1 MG/DL (ref 8.6–10.5)
CHLORIDE SERPL-SCNC: 102 MMOL/L (ref 98–107)
CO2 SERPL-SCNC: 20.1 MMOL/L (ref 22–29)
CREAT BLD-MCNC: 0.51 MG/DL (ref 0.57–1)
DEPRECATED RDW RBC AUTO: 46.9 FL (ref 37–54)
EOSINOPHIL # BLD AUTO: 0.07 10*3/MM3 (ref 0–0.7)
EOSINOPHIL NFR BLD AUTO: 0.9 % (ref 0.3–6.2)
ERYTHROCYTE [DISTWIDTH] IN BLOOD BY AUTOMATED COUNT: 14 % (ref 11.7–13)
GAS FLOW AIRWAY: 10 LPM
GFR SERPL CREATININE-BSD FRML MDRD: 134 ML/MIN/1.73
GLOBULIN UR ELPH-MCNC: 3.3 GM/DL
GLUCOSE BLD-MCNC: 74 MG/DL (ref 65–99)
GLUCOSE BLDC GLUCOMTR-MCNC: 74 MG/DL (ref 70–130)
GLUCOSE BLDC GLUCOMTR-MCNC: 88 MG/DL (ref 70–130)
HCO3 BLDCOA-SCNC: 23.7 MMOL/L (ref 22–28)
HCT VFR BLD AUTO: 38.4 % (ref 35.6–45.5)
HGB BLD-MCNC: 12.5 G/DL (ref 11.9–15.5)
IMM GRANULOCYTES # BLD: 0.13 10*3/MM3 (ref 0–0.03)
IMM GRANULOCYTES NFR BLD: 1.7 % (ref 0–0.5)
LYMPHOCYTES # BLD AUTO: 1.62 10*3/MM3 (ref 0.9–4.8)
LYMPHOCYTES NFR BLD AUTO: 20.9 % (ref 19.6–45.3)
MCH RBC QN AUTO: 30.2 PG (ref 26.9–32)
MCHC RBC AUTO-ENTMCNC: 32.6 G/DL (ref 32.4–36.3)
MCV RBC AUTO: 92.8 FL (ref 80.5–98.2)
MODALITY: ABNORMAL
MONOCYTES # BLD AUTO: 0.7 10*3/MM3 (ref 0.2–1.2)
MONOCYTES NFR BLD AUTO: 9 % (ref 5–12)
NEUTROPHILS # BLD AUTO: 5.2 10*3/MM3 (ref 1.9–8.1)
NEUTROPHILS NFR BLD AUTO: 67.2 % (ref 42.7–76)
PCO2 BLDCOA: 61.1 MMHG
PH BLDCOA: 7.2 [PH] (ref 7.18–7.34)
PLATELET # BLD AUTO: 174 10*3/MM3 (ref 140–500)
PMV BLD AUTO: 11 FL (ref 6–12)
PO2 BLDCOA: 33.5 MMHG (ref 12–26)
POTASSIUM BLD-SCNC: 3.9 MMOL/L (ref 3.5–5.2)
PROT SERPL-MCNC: 6.3 G/DL (ref 6–8.5)
RBC # BLD AUTO: 4.14 10*6/MM3 (ref 3.9–5.2)
RH BLD: POSITIVE
SAO2 % BLDCOA: 50 % (ref 92–99)
SODIUM BLD-SCNC: 137 MMOL/L (ref 136–145)
WBC NRBC COR # BLD: 7.74 10*3/MM3 (ref 4.5–10.7)

## 2017-01-25 PROCEDURE — 86901 BLOOD TYPING SEROLOGIC RH(D): CPT

## 2017-01-25 PROCEDURE — 80053 COMPREHEN METABOLIC PANEL: CPT | Performed by: OBSTETRICS & GYNECOLOGY

## 2017-01-25 PROCEDURE — 86850 RBC ANTIBODY SCREEN: CPT

## 2017-01-25 PROCEDURE — C1755 CATHETER, INTRASPINAL: HCPCS

## 2017-01-25 PROCEDURE — 10907ZC DRAINAGE OF AMNIOTIC FLUID, THERAPEUTIC FROM PRODUCTS OF CONCEPTION, VIA NATURAL OR ARTIFICIAL OPENING: ICD-10-PCS | Performed by: OBSTETRICS & GYNECOLOGY

## 2017-01-25 PROCEDURE — 82962 GLUCOSE BLOOD TEST: CPT

## 2017-01-25 PROCEDURE — 3E033VJ INTRODUCTION OF OTHER HORMONE INTO PERIPHERAL VEIN, PERCUTANEOUS APPROACH: ICD-10-PCS | Performed by: OBSTETRICS & GYNECOLOGY

## 2017-01-25 PROCEDURE — 82803 BLOOD GASES ANY COMBINATION: CPT

## 2017-01-25 PROCEDURE — 25010000002 NALBUPHINE PER 10 MG: Performed by: OBSTETRICS & GYNECOLOGY

## 2017-01-25 PROCEDURE — 85025 COMPLETE CBC W/AUTO DIFF WBC: CPT | Performed by: OBSTETRICS & GYNECOLOGY

## 2017-01-25 PROCEDURE — 25010000002 ROPIVACAINE PER 1 MG: Performed by: ANESTHESIOLOGY

## 2017-01-25 PROCEDURE — 86900 BLOOD TYPING SEROLOGIC ABO: CPT

## 2017-01-25 RX ORDER — SODIUM CHLORIDE, SODIUM LACTATE, POTASSIUM CHLORIDE, CALCIUM CHLORIDE 600; 310; 30; 20 MG/100ML; MG/100ML; MG/100ML; MG/100ML
125 INJECTION, SOLUTION INTRAVENOUS CONTINUOUS
Status: DISCONTINUED | OUTPATIENT
Start: 2017-01-25 | End: 2017-01-27

## 2017-01-25 RX ORDER — CARBOPROST TROMETHAMINE 250 UG/ML
250 INJECTION, SOLUTION INTRAMUSCULAR AS NEEDED
Status: DISCONTINUED | OUTPATIENT
Start: 2017-01-25 | End: 2017-01-27 | Stop reason: HOSPADM

## 2017-01-25 RX ORDER — PROMETHAZINE HYDROCHLORIDE 25 MG/ML
12.5 INJECTION, SOLUTION INTRAMUSCULAR; INTRAVENOUS EVERY 6 HOURS PRN
Status: DISCONTINUED | OUTPATIENT
Start: 2017-01-25 | End: 2017-01-27 | Stop reason: HOSPADM

## 2017-01-25 RX ORDER — DIPHENHYDRAMINE HCL 25 MG
25 CAPSULE ORAL NIGHTLY PRN
Status: DISCONTINUED | OUTPATIENT
Start: 2017-01-25 | End: 2017-01-27 | Stop reason: HOSPADM

## 2017-01-25 RX ORDER — BUPIVACAINE HYDROCHLORIDE AND EPINEPHRINE 2.5; 5 MG/ML; UG/ML
INJECTION, SOLUTION EPIDURAL; INFILTRATION; INTRACAUDAL; PERINEURAL
Status: COMPLETED
Start: 2017-01-25 | End: 2017-01-25

## 2017-01-25 RX ORDER — SODIUM CHLORIDE 0.9 % (FLUSH) 0.9 %
1-10 SYRINGE (ML) INJECTION AS NEEDED
Status: DISCONTINUED | OUTPATIENT
Start: 2017-01-25 | End: 2017-01-25 | Stop reason: HOSPADM

## 2017-01-25 RX ORDER — OXYTOCIN/RINGER'S LACTATE 10/500ML
999 PLASTIC BAG, INJECTION (ML) INTRAVENOUS ONCE
Status: COMPLETED | OUTPATIENT
Start: 2017-01-25 | End: 2017-01-25

## 2017-01-25 RX ORDER — OXYCODONE HYDROCHLORIDE AND ACETAMINOPHEN 5; 325 MG/1; MG/1
2 TABLET ORAL
Status: DISCONTINUED | OUTPATIENT
Start: 2017-01-25 | End: 2017-01-27 | Stop reason: HOSPADM

## 2017-01-25 RX ORDER — OXYCODONE HYDROCHLORIDE AND ACETAMINOPHEN 5; 325 MG/1; MG/1
2 TABLET ORAL EVERY 4 HOURS PRN
Status: DISCONTINUED | OUTPATIENT
Start: 2017-01-25 | End: 2017-01-27 | Stop reason: HOSPADM

## 2017-01-25 RX ORDER — MINERAL OIL
30 OIL (ML) MISCELLANEOUS AS NEEDED
Status: DISCONTINUED | OUTPATIENT
Start: 2017-01-25 | End: 2017-01-27 | Stop reason: HOSPADM

## 2017-01-25 RX ORDER — IBUPROFEN 800 MG/1
800 TABLET ORAL EVERY 8 HOURS
Status: DISCONTINUED | OUTPATIENT
Start: 2017-01-25 | End: 2017-01-27 | Stop reason: HOSPADM

## 2017-01-25 RX ORDER — OXYCODONE HYDROCHLORIDE AND ACETAMINOPHEN 5; 325 MG/1; MG/1
1 TABLET ORAL EVERY 4 HOURS PRN
Status: DISCONTINUED | OUTPATIENT
Start: 2017-01-25 | End: 2017-01-27 | Stop reason: HOSPADM

## 2017-01-25 RX ORDER — OXYTOCIN/RINGER'S LACTATE 10/500ML
999 PLASTIC BAG, INJECTION (ML) INTRAVENOUS ONCE
Status: DISCONTINUED | OUTPATIENT
Start: 2017-01-25 | End: 2017-01-27 | Stop reason: HOSPADM

## 2017-01-25 RX ORDER — SUFENTANIL CITRATE 50 UG/ML
INJECTION EPIDURAL; INTRAVENOUS
Status: COMPLETED
Start: 2017-01-25 | End: 2017-01-25

## 2017-01-25 RX ORDER — FAMOTIDINE 10 MG/ML
20 INJECTION, SOLUTION INTRAVENOUS ONCE AS NEEDED
Status: DISCONTINUED | OUTPATIENT
Start: 2017-01-25 | End: 2017-01-25 | Stop reason: HOSPADM

## 2017-01-25 RX ORDER — ONDANSETRON 4 MG/1
4 TABLET, ORALLY DISINTEGRATING ORAL EVERY 6 HOURS PRN
Status: DISCONTINUED | OUTPATIENT
Start: 2017-01-25 | End: 2017-01-27 | Stop reason: HOSPADM

## 2017-01-25 RX ORDER — MINERAL OIL
OIL (ML) MISCELLANEOUS
Status: DISCONTINUED
Start: 2017-01-25 | End: 2017-01-25 | Stop reason: WASHOUT

## 2017-01-25 RX ORDER — ZOLPIDEM TARTRATE 5 MG/1
5 TABLET ORAL NIGHTLY PRN
Status: DISCONTINUED | OUTPATIENT
Start: 2017-01-25 | End: 2017-01-27 | Stop reason: HOSPADM

## 2017-01-25 RX ORDER — URSODIOL 250 MG/1
500 TABLET, FILM COATED ORAL 3 TIMES DAILY
Status: DISCONTINUED | OUTPATIENT
Start: 2017-01-25 | End: 2017-01-27 | Stop reason: HOSPADM

## 2017-01-25 RX ORDER — DIPHENHYDRAMINE HYDROCHLORIDE 50 MG/ML
12.5 INJECTION INTRAMUSCULAR; INTRAVENOUS EVERY 8 HOURS PRN
Status: DISCONTINUED | OUTPATIENT
Start: 2017-01-25 | End: 2017-01-25 | Stop reason: HOSPADM

## 2017-01-25 RX ORDER — ONDANSETRON 2 MG/ML
4 INJECTION INTRAMUSCULAR; INTRAVENOUS EVERY 6 HOURS PRN
Status: DISCONTINUED | OUTPATIENT
Start: 2017-01-25 | End: 2017-01-27 | Stop reason: HOSPADM

## 2017-01-25 RX ORDER — LIDOCAINE HYDROCHLORIDE AND EPINEPHRINE 10; 10 MG/ML; UG/ML
INJECTION, SOLUTION INFILTRATION; PERINEURAL AS NEEDED
Status: DISCONTINUED | OUTPATIENT
Start: 2017-01-25 | End: 2017-01-25 | Stop reason: SURG

## 2017-01-25 RX ORDER — ACETAMINOPHEN 325 MG/1
650 TABLET ORAL EVERY 4 HOURS PRN
Status: DISCONTINUED | OUTPATIENT
Start: 2017-01-25 | End: 2017-01-27 | Stop reason: HOSPADM

## 2017-01-25 RX ORDER — PROMETHAZINE HYDROCHLORIDE 12.5 MG/1
12.5 SUPPOSITORY RECTAL EVERY 6 HOURS PRN
Status: DISCONTINUED | OUTPATIENT
Start: 2017-01-25 | End: 2017-01-27 | Stop reason: HOSPADM

## 2017-01-25 RX ORDER — OXYTOCIN/RINGER'S LACTATE 10/500ML
125 PLASTIC BAG, INJECTION (ML) INTRAVENOUS CONTINUOUS PRN
Status: DISCONTINUED | OUTPATIENT
Start: 2017-01-25 | End: 2017-01-25 | Stop reason: HOSPADM

## 2017-01-25 RX ORDER — ONDANSETRON 4 MG/1
4 TABLET, ORALLY DISINTEGRATING ORAL EVERY 6 HOURS PRN
Status: DISCONTINUED | OUTPATIENT
Start: 2017-01-25 | End: 2017-01-25 | Stop reason: HOSPADM

## 2017-01-25 RX ORDER — LANOLIN 100 %
OINTMENT (GRAM) TOPICAL
Status: DISCONTINUED | OUTPATIENT
Start: 2017-01-25 | End: 2017-01-27 | Stop reason: HOSPADM

## 2017-01-25 RX ORDER — PROMETHAZINE HYDROCHLORIDE 25 MG/1
12.5 TABLET ORAL EVERY 6 HOURS PRN
Status: DISCONTINUED | OUTPATIENT
Start: 2017-01-25 | End: 2017-01-27 | Stop reason: HOSPADM

## 2017-01-25 RX ORDER — DIPHENHYDRAMINE HYDROCHLORIDE 50 MG/ML
25 INJECTION INTRAMUSCULAR; INTRAVENOUS NIGHTLY PRN
Status: DISCONTINUED | OUTPATIENT
Start: 2017-01-25 | End: 2017-01-27 | Stop reason: HOSPADM

## 2017-01-25 RX ORDER — LIDOCAINE HYDROCHLORIDE AND EPINEPHRINE BITARTRATE 20; .01 MG/ML; MG/ML
INJECTION, SOLUTION SUBCUTANEOUS AS NEEDED
Status: DISCONTINUED | OUTPATIENT
Start: 2017-01-25 | End: 2017-01-25 | Stop reason: SURG

## 2017-01-25 RX ORDER — MISOPROSTOL 200 UG/1
800 TABLET ORAL AS NEEDED
Status: DISCONTINUED | OUTPATIENT
Start: 2017-01-25 | End: 2017-01-27 | Stop reason: HOSPADM

## 2017-01-25 RX ORDER — PROMETHAZINE HYDROCHLORIDE 25 MG/1
25 TABLET ORAL EVERY 6 HOURS PRN
Status: DISCONTINUED | OUTPATIENT
Start: 2017-01-25 | End: 2017-01-27 | Stop reason: HOSPADM

## 2017-01-25 RX ORDER — IBUPROFEN 600 MG/1
600 TABLET ORAL EVERY 6 HOURS PRN
Status: DISCONTINUED | OUTPATIENT
Start: 2017-01-25 | End: 2017-01-27 | Stop reason: HOSPADM

## 2017-01-25 RX ORDER — DIPHENHYDRAMINE HCL 25 MG
25 CAPSULE ORAL NIGHTLY PRN
Status: DISCONTINUED | OUTPATIENT
Start: 2017-01-25 | End: 2017-01-25 | Stop reason: HOSPADM

## 2017-01-25 RX ORDER — DIPHENHYDRAMINE HYDROCHLORIDE 50 MG/ML
25 INJECTION INTRAMUSCULAR; INTRAVENOUS NIGHTLY PRN
Status: DISCONTINUED | OUTPATIENT
Start: 2017-01-25 | End: 2017-01-25 | Stop reason: HOSPADM

## 2017-01-25 RX ORDER — METHYLERGONOVINE MALEATE 0.2 MG/ML
200 INJECTION INTRAVENOUS AS NEEDED
Status: DISCONTINUED | OUTPATIENT
Start: 2017-01-25 | End: 2017-01-27 | Stop reason: HOSPADM

## 2017-01-25 RX ORDER — ONDANSETRON 4 MG/1
4 TABLET, FILM COATED ORAL EVERY 6 HOURS PRN
Status: DISCONTINUED | OUTPATIENT
Start: 2017-01-25 | End: 2017-01-27 | Stop reason: HOSPADM

## 2017-01-25 RX ORDER — SODIUM CHLORIDE 0.9 % (FLUSH) 0.9 %
1-10 SYRINGE (ML) INJECTION AS NEEDED
Status: DISCONTINUED | OUTPATIENT
Start: 2017-01-25 | End: 2017-01-27 | Stop reason: HOSPADM

## 2017-01-25 RX ORDER — LIDOCAINE HYDROCHLORIDE AND EPINEPHRINE 15; 5 MG/ML; UG/ML
INJECTION, SOLUTION EPIDURAL AS NEEDED
Status: DISCONTINUED | OUTPATIENT
Start: 2017-01-25 | End: 2017-01-25 | Stop reason: SURG

## 2017-01-25 RX ORDER — ONDANSETRON 2 MG/ML
4 INJECTION INTRAMUSCULAR; INTRAVENOUS ONCE AS NEEDED
Status: DISCONTINUED | OUTPATIENT
Start: 2017-01-25 | End: 2017-01-25 | Stop reason: HOSPADM

## 2017-01-25 RX ORDER — DOCUSATE SODIUM 100 MG/1
100 CAPSULE, LIQUID FILLED ORAL 2 TIMES DAILY
Status: DISCONTINUED | OUTPATIENT
Start: 2017-01-25 | End: 2017-01-27 | Stop reason: HOSPADM

## 2017-01-25 RX ORDER — TERBUTALINE SULFATE 1 MG/ML
0.25 INJECTION, SOLUTION SUBCUTANEOUS AS NEEDED
Status: DISCONTINUED | OUTPATIENT
Start: 2017-01-25 | End: 2017-01-25 | Stop reason: HOSPADM

## 2017-01-25 RX ORDER — PROMETHAZINE HYDROCHLORIDE 25 MG/ML
12.5 INJECTION, SOLUTION INTRAMUSCULAR; INTRAVENOUS EVERY 4 HOURS PRN
Status: DISCONTINUED | OUTPATIENT
Start: 2017-01-25 | End: 2017-01-27 | Stop reason: HOSPADM

## 2017-01-25 RX ORDER — ACETAMINOPHEN 650 MG/1
650 SUPPOSITORY RECTAL EVERY 4 HOURS PRN
Status: DISCONTINUED | OUTPATIENT
Start: 2017-01-25 | End: 2017-01-27 | Stop reason: HOSPADM

## 2017-01-25 RX ORDER — ONDANSETRON 4 MG/1
4 TABLET, FILM COATED ORAL EVERY 6 HOURS PRN
Status: DISCONTINUED | OUTPATIENT
Start: 2017-01-25 | End: 2017-01-25 | Stop reason: HOSPADM

## 2017-01-25 RX ORDER — NALBUPHINE HCL 10 MG/ML
5 AMPUL (ML) INJECTION
Status: DISCONTINUED | OUTPATIENT
Start: 2017-01-25 | End: 2017-01-25 | Stop reason: HOSPADM

## 2017-01-25 RX ORDER — OXYCODONE HYDROCHLORIDE AND ACETAMINOPHEN 5; 325 MG/1; MG/1
1 TABLET ORAL
Status: DISCONTINUED | OUTPATIENT
Start: 2017-01-25 | End: 2017-01-27 | Stop reason: HOSPADM

## 2017-01-25 RX ORDER — FAMOTIDINE 20 MG/1
20 TABLET, FILM COATED ORAL 2 TIMES DAILY
Status: DISCONTINUED | OUTPATIENT
Start: 2017-01-25 | End: 2017-01-25 | Stop reason: HOSPADM

## 2017-01-25 RX ORDER — BISACODYL 10 MG
10 SUPPOSITORY, RECTAL RECTAL DAILY PRN
Status: DISCONTINUED | OUTPATIENT
Start: 2017-01-26 | End: 2017-01-27 | Stop reason: HOSPADM

## 2017-01-25 RX ORDER — LIDOCAINE HYDROCHLORIDE 10 MG/ML
5 INJECTION, SOLUTION INFILTRATION; PERINEURAL AS NEEDED
Status: DISCONTINUED | OUTPATIENT
Start: 2017-01-25 | End: 2017-01-25 | Stop reason: HOSPADM

## 2017-01-25 RX ORDER — FAMOTIDINE 10 MG/ML
20 INJECTION, SOLUTION INTRAVENOUS 2 TIMES DAILY
Status: DISCONTINUED | OUTPATIENT
Start: 2017-01-25 | End: 2017-01-25 | Stop reason: HOSPADM

## 2017-01-25 RX ORDER — OXYTOCIN/RINGER'S LACTATE 10/500ML
1-30 PLASTIC BAG, INJECTION (ML) INTRAVENOUS
Status: DISCONTINUED | OUTPATIENT
Start: 2017-01-25 | End: 2017-01-25 | Stop reason: HOSPADM

## 2017-01-25 RX ORDER — ACETAMINOPHEN 500 MG
1000 TABLET ORAL EVERY 4 HOURS PRN
Status: DISCONTINUED | OUTPATIENT
Start: 2017-01-25 | End: 2017-01-25 | Stop reason: HOSPADM

## 2017-01-25 RX ORDER — ROPIVACAINE HYDROCHLORIDE 2 MG/ML
INJECTION, SOLUTION EPIDURAL; INFILTRATION; PERINEURAL AS NEEDED
Status: DISCONTINUED | OUTPATIENT
Start: 2017-01-25 | End: 2017-01-25 | Stop reason: SURG

## 2017-01-25 RX ORDER — OXYTOCIN/RINGER'S LACTATE 10/500ML
125 PLASTIC BAG, INJECTION (ML) INTRAVENOUS CONTINUOUS PRN
Status: ACTIVE | OUTPATIENT
Start: 2017-01-25 | End: 2017-01-26

## 2017-01-25 RX ORDER — ONDANSETRON 2 MG/ML
4 INJECTION INTRAMUSCULAR; INTRAVENOUS EVERY 6 HOURS PRN
Status: DISCONTINUED | OUTPATIENT
Start: 2017-01-25 | End: 2017-01-25 | Stop reason: HOSPADM

## 2017-01-25 RX ORDER — ACETAMINOPHEN 500 MG
1000 TABLET ORAL EVERY 4 HOURS PRN
Status: DISCONTINUED | OUTPATIENT
Start: 2017-01-25 | End: 2017-01-27 | Stop reason: HOSPADM

## 2017-01-25 RX ADMIN — SODIUM CHLORIDE, POTASSIUM CHLORIDE, SODIUM LACTATE AND CALCIUM CHLORIDE 1000 ML: 600; 310; 30; 20 INJECTION, SOLUTION INTRAVENOUS at 07:50

## 2017-01-25 RX ADMIN — LIDOCAINE HYDROCHLORIDE,EPINEPHRINE BITARTRATE 5 ML: 10; .01 INJECTION, SOLUTION INFILTRATION; PERINEURAL at 12:25

## 2017-01-25 RX ADMIN — BUPIVACAINE HYDROCHLORIDE AND EPINEPHRINE BITARTRATE 10 ML: 2.5; .0091 INJECTION, SOLUTION EPIDURAL; INFILTRATION; INTRACAUDAL; PERINEURAL at 12:50

## 2017-01-25 RX ADMIN — AMPICILLIN SODIUM 2 G: 2 INJECTION, POWDER, FOR SOLUTION INTRAMUSCULAR; INTRAVENOUS at 11:15

## 2017-01-25 RX ADMIN — SODIUM CHLORIDE, POTASSIUM CHLORIDE, SODIUM LACTATE AND CALCIUM CHLORIDE 125 ML/HR: 600; 310; 30; 20 INJECTION, SOLUTION INTRAVENOUS at 10:14

## 2017-01-25 RX ADMIN — URSODIOL 500 MG: 250 TABLET, FILM COATED ORAL at 21:50

## 2017-01-25 RX ADMIN — OXYTOCIN 2 MILLI-UNITS/MIN: 10 INJECTION, SOLUTION INTRAMUSCULAR; INTRAVENOUS at 08:30

## 2017-01-25 RX ADMIN — SUFENTANIL CITRATE 25 MCG: 50 INJECTION EPIDURAL; INTRAVENOUS at 12:50

## 2017-01-25 RX ADMIN — Medication 8 ML/HR: at 10:29

## 2017-01-25 RX ADMIN — Medication 1 APPLICATION: at 18:14

## 2017-01-25 RX ADMIN — LIDOCAINE HYDROCHLORIDE AND EPINEPHRINE 3 ML: 15; 5 INJECTION, SOLUTION EPIDURAL at 10:21

## 2017-01-25 RX ADMIN — LIDOCAINE HYDROCHLORIDE AND EPINEPHRINE 5 ML: 20; 10 INJECTION, SOLUTION INFILTRATION; PERINEURAL at 13:22

## 2017-01-25 RX ADMIN — IBUPROFEN 800 MG: 800 TABLET ORAL at 18:14

## 2017-01-25 RX ADMIN — OXYTOCIN 999 ML/HR: 10 INJECTION, SOLUTION INTRAMUSCULAR; INTRAVENOUS at 14:26

## 2017-01-25 RX ADMIN — NALBUPHINE HYDROCHLORIDE 5 MG: 10 INJECTION, SOLUTION INTRAMUSCULAR; INTRAVENOUS; SUBCUTANEOUS at 10:09

## 2017-01-25 RX ADMIN — OXYTOCIN 125 ML/HR: 10 INJECTION, SOLUTION INTRAMUSCULAR; INTRAVENOUS at 14:53

## 2017-01-25 RX ADMIN — DOCUSATE SODIUM 100 MG: 100 CAPSULE, LIQUID FILLED ORAL at 18:14

## 2017-01-25 RX ADMIN — ROPIVACAINE HYDROCHLORIDE 6 ML: 2 INJECTION, SOLUTION EPIDURAL; INFILTRATION; PERINEURAL at 10:25

## 2017-01-25 NOTE — ANESTHESIA PREPROCEDURE EVALUATION
Anesthesia Evaluation     Patient summary reviewed and Nursing notes reviewed    No history of anesthetic complications   Airway   Mallampati: II  TM distance: >3 FB  Neck ROM: full  no difficulty expected  Dental - normal exam     Pulmonary - negative pulmonary ROS and normal exam   Cardiovascular - negative cardio ROS and normal exam  Exercise tolerance: good (4-7 METS)    ECG reviewed  Rhythm: regular  Rate: normal    Neuro/Psych- negative ROS  GI/Hepatic/Renal/Endo    (+)  diabetes mellitus using insulin,     ROS Comment: Gestational DM. chronic cholestasis, pt sees GI routinely.  Has had 5 ERCP's.      Musculoskeletal (-) negative ROS    Abdominal  - normal exam   Substance History - negative use     OB/GYN    (+) Pregnant,         Other - negative ROS                          Anesthesia Plan    ASA 3     epidural     Anesthetic plan and risks discussed with patient.    Plan discussed with attending.

## 2017-01-25 NOTE — LACTATION NOTE
This note was copied from a baby's chart.  P2 . Second son born at 36 weeks. First son never did nurse well so by six weeks mom was exclusively pumping and did so for 6 months. This baby is eager and cueing and has nursed well x2.  HGP at bedside with referesher on use and cleaning.

## 2017-01-25 NOTE — ANESTHESIA PROCEDURE NOTES
Labor Epidural    Patient location during procedure: OB  Start Time: 1/25/2017 10:15 AM  Stop Time: 1/25/2017 10:21 AM  Performed By  Anesthesiologist: JEVON CARR  Preanesthetic Checklist  Completed: patient identified, site marked, surgical consent, pre-op evaluation, timeout performed, IV checked, risks and benefits discussed and monitors and equipment checked  Additional Notes  Labor epidural using Arrow kit, no heme/CSF aspirated, no paraesthesias.  Test dose with 3cc 1.5% lido with epi is negative.    Epidural Block Prep:  Pt Position:sitting  Sterile Tech:cap, gloves, mask and sterile barrier  Monitoring:blood pressure monitoring, continuous pulse oximetry and EKG  Epidural Block Procedure:  Approach:midline  Guidance:landmark technique and palpation technique  Location:L3-L4  Needle Type:Tuohy  Needle Gauge:17  Loss of Resistance: 5cm  Cath Depth at skin:9 cm  Paresthesia: none  Aspiration:negative  Test Dose:negative  Post Assessment:  Dressing:occlusive dressing applied and secured with tape  Pt Tolerance:patient tolerated the procedure well with no apparent complications  Complications:no

## 2017-01-25 NOTE — L&D DELIVERY NOTE
[facility]  Vaginal Delivery Note    Delivery    Khadra Royal41 y.o.  at 36w4d    Induction: Oxytocin   Induction indication:     Cervical ripening:        Augmentation: None   Labor onset:2017  12:00 PM   Dilation complete: 2017  1:34 PM   Beginning of second stage: 2017  1:45 PM     Antibiotics received during labor: Yes            Delivery: Vaginal, Spontaneous Delivery     YOB: 2015    Time of Birth: 2:19 PM      Anesthesia: Epidural     Delivering clinician: Krystin Acosta MD       Infant    Findings: Viable male  infant    Infant observations: Weight: 6 lb 9.1 oz (2.98 kg)    Observations/Comments:  ACTIVE, CRYING, PINK      Apgars: 9   @ 1 minute /    9   @ 5 minutes     Placenta, Cord, and Fluid    Placenta delivered  Spontaneous   at    2:26 PM     Cord: 3 vessels  present.   Cord blood obtained: Yes    Cord gases obtained:  Yes      Repair    Episiotomy: none   Lacerations: none   Estimated Blood Loss: 200  mls.       Delivery narrative: The patient is a 41 y.o.  at 36w4d.  Presented for pit induction due to cholestasis  of preg. actigal 1500mg daily and bile acids increased to 60s. GDM controlled with insulin. S/p betamethasone /-17. glc in labor 70s.    Pit to 8 mu. Wanted epidural before AROM.  Membrane rupture/fluid: artificial rupture of membranes  at 12:00 PM  on 2017  Clear . gbs neg but s/p amp x1 dose for  <37wks.  She progressed appropriately in labor. FHR were overall reassuring without persistent worrisome decelerations.  SheProgressed to complete at 1:34 PM . Labored down until 2017  1:45 PM . She pushed 15-20 minutes and had a   of a  6 lb 9.1 oz (2.98 kg)  male   infant   9   @ 1 minute /   9   @ 5 minutes. OP position. The right shoulder was the anterior shoulder and easily delivered. Arterial was pH sent.    Placenta was spontaneously delivered,3 vessel cord, intact. . Cervix and rectum intact. There was no  laceration. EBL was 200  mls. There were no complications. Mother and baby doing well at time of dictation.      Krystin Acosta MD  01/26/17  7:56 PM

## 2017-01-25 NOTE — PLAN OF CARE
Problem: Labor (Cervical Ripen, Induct, Augment) (Adult,Obstetrics,Pediatric)  Goal: Signs and Symptoms of Listed Potential Problems Will be Absent or Manageable (Labor)  Outcome: Outcome(s) achieved Date Met:  01/25/17 01/25/17 0830   Labor (Cervical Ripen, Induct, Augment)   Problems Assessed (Labor) fetal well-being change;pain;shoulder dystocia;umbilical cord prolapse;uterine tachysystole   Problems Present (Labor) none

## 2017-01-25 NOTE — H&P
.[facility]  Obstetric History and Physical    Chief Complaint   Patient presents with   • Scheduled Induction       Subjective     Patient is a 41 y.o. female  currently at 36w4d, who presents for induction due cholestasis.      Chronic cholestasis since first preg. maxed out on actigal. Bile acids up to 60s despite max dose actigal. Since planning 36+ wk delivery, she got BMZ - 2 doses 1-/- 17.    GDM on insulin. Has had good control despite inc insulin requirements.            Objective       Vital Signs Range for the last 24 hours  Temperature: Temp:  [97.7 °F (36.5 °C)-97.9 °F (36.6 °C)] 97.9 °F (36.6 °C)   Temp Source: Temp src: Oral   BP: BP: (111-171)/(56-85) 153/81   Pulse: Heart Rate:  [57-83] 66   Respirations: Resp:  [16-18] 16                   Physical Examination:     General :  Alert in NAD  Abdomen: Gravid, nontender        Cervix: Exam by: Method: sterile exam per physician   Dilation: Dilation: 3.5   Effacement: Cervical Effacement: 80%   Station: Station: -2       Fetal Heart Rate Assessment   Method: Fetal HR Assessment Method: external   Beats/min: Fetal HR (Beats/Min): 125   Baseline: Fetal HR Baseline: normal range (110-160 bpm)   Varibility: Fetal HR Variability: moderate (amplitude range 6 to 25 bpm)   Accels: Fetal HR Accelerations: greater than/equal to 15 bpm   Decels: Fetal HR Decelerations: absent   Tracing Category: Fetal HR Tracing Category: Category I     Uterine Assessment   Method: Method: TOCO (external toco transducer)   Frequency (min): Contraction Frequency (min): 2-3.5   Ctx Count in 10 min: Contraction Frequency in 10min: 4   Duration: Contraction Duration (sec): 60-80   Intensity: Contraction Intensity: strong by palpation   Intensity by IUPC:     Resting Tone: Uterine Resting Tone: relaxation >60 sec   Resting Tone by IUPC:     [place] Units:           Comfortable with epidural.... AROM- clear        Assessment/Plan       Assessment:  1.  Intrauterine  pregnancy at 36w4d weeks gestation with reassuring fetal status.    2.  Cholestasis - max dose of actigal. Bile acids in 60s. Reason for induction. S/p bmz for 36+ wk induction  3. GDM- controlled with insulin - 70s today in labor    Plan:   Plan of care has been reviewed with patient and family,.   All questions answered.          Office prenatal reviewed        Krystin Acosta MD  1/25/2017  12:34 PM

## 2017-01-25 NOTE — IP AVS SNAPSHOT
AFTER VISIT SUMMARY             Khadra Royal           About your hospitalization     You were admitted on:  January 25, 2017 You last received care in the:  [address]       Procedures & Surgeries         Medications    If you or your caregiver advised us that you are currently taking a medication and that medication is marked below as “Resume”, this simply indicates that we have reviewed those medications to make sure our new therapy recommendations do not interfere.  If you have concerns about medications other than those new ones which we are prescribing today, please consult the physician who prescribed them (or your primary physician).  Our review of your home medications is not meant to indicate that we are directing their use.             Your Medications      START taking these medications     ibuprofen 800 MG tablet   Take 1 tablet by mouth Every 8 (Eight) Hours for 1 day.   Last time this was given:  1/27/2017  9:46 AM   Commonly known as:  ADVIL,MOTRIN           oxyCODONE-acetaminophen 5-325 MG per tablet   Take 2 tablets by mouth Every 4 (Four) Hours As Needed for severe pain (7-10) for up to 2 days.   Last time this was given:  1/26/2017  6:57 AM   Commonly known as:  PERCOCET             CONTINUE taking these medications     cholecalciferol 1000 UNITS tablet   Take 2,000 Units by mouth Daily.   Commonly known as:  VITAMIN D3           DHA ALGAL-900 300 MG capsule   Take  by mouth.   Generic drug:  Docosahexaenoic Acid           docusate sodium 100 MG capsule   Take 100 mg by mouth 2 (Two) Times a Day.   Last time this was given:  1/27/2017  9:46 AM   Commonly known as:  COLACE           prenatal (CLASSIC) vitamin 28-0.8 MG tablet tablet   Take 1 tablet by mouth Daily.           ursodiol 250 MG tablet   Take 500 mg by mouth 3 (Three) Times a Day.   Last time this was given:  1/27/2017  9:46 AM   Commonly known as:  ACTIGALL             STOP taking these medications     aspirin 81 MG chewable tablet           insulin detemir 100 UNIT/ML injection   Commonly known as:  LEVEMIR           magnesium gluconate 500 MG tablet   Commonly known as:  MAGONATE                Where to Get Your Medications      These medications were sent to [facility]/pharmacy #[id number] - [place], [place] - [address] IN THE [place] - [phone] [facility] [phone]   [address], [place] [id number]    Hours:  24-hours Phone:  [phone]     ibuprofen 800 MG tablet         You can get these medications from any pharmacy     Bring a paper prescription for each of these medications     oxyCODONE-acetaminophen 5-325 MG per tablet                  Your Medications      Your Medication List           Morning Noon Evening Bedtime As Needed    cholecalciferol 1000 UNITS tablet   Take 2,000 Units by mouth Daily.   Commonly known as:  VITAMIN D3                                DHA ALGAL-900 300 MG capsule   Take  by mouth.   Generic drug:  Docosahexaenoic Acid                                docusate sodium 100 MG capsule   Take 100 mg by mouth 2 (Two) Times a Day.   Commonly known as:  COLACE                                ibuprofen 800 MG tablet   Take 1 tablet by mouth Every 8 (Eight) Hours for 1 day.   Commonly known as:  ADVIL,MOTRIN                                oxyCODONE-acetaminophen 5-325 MG per tablet   Take 2 tablets by mouth Every 4 (Four) Hours As Needed for severe pain (7-10) for up to 2 days.   Commonly known as:  PERCOCET                                prenatal (CLASSIC) vitamin 28-0.8 MG tablet tablet   Take 1 tablet by mouth Daily.                                ursodiol 250 MG tablet   Take 500 mg by mouth 3 (Three) Times a Day.   Commonly known as:  ACTIGALL                                         Instructions for After Discharge        Discharge References/Attachments     POSTPARTUM CARE AFTER VAGINAL DELIVERY (ENGLISH)    POSTPARTUM DEPRESSION AND BABY BLUES (ENGLISH)    BREASTFEEDING  (ENGLISH)    EATING PLAN FOR BREASTFEEDING WOMEN (ENGLISH)    BREASTFEEDING CHALLENGES AND SOLUTIONS (ENGLISH)    BREAST ENGORGEMENT (ENGLISH)    IBUPROFEN TABLETS AND CAPSULES (ENGLISH)    ACETAMINOPHEN; OXYCODONE TABLETS (ENGLISH)       Follow-ups for After Discharge        Follow-up Information     Follow up with Krystin Acosta MD .    Specialties:  Obstetrics and Gynecology, Gynecology    Contact information:    [address]  [facility] [address]  [place] [address]  [phone]        Referrals and Follow-ups to Schedule     Call MD With Problems / Concerns    As directed    Instructions:  call for fever, increased vaginal bleeding or pain, any other concerns             [employer] Signup     Our records indicate that you have an active [employer] account.    You can view your After Visit Summary by going to [employer] and logging in with your [employer] username and password.  If you don't have a [employer] username and password but a parent or guardian has access to your record, the parent or guardian should login with their own [employer] username and password and access your record to view the After Visit Summary.    If you have questions, you can email [email or web address] or call [phone] to talk to our [employer] staff.  Remember, [employer] is NOT to be used for urgent needs.  For medical emergencies, dial 911.           Summary of Your Hospitalization        Reason for Hospitalization     Your primary diagnosis was:  Not on File    Your diagnoses also included:  Pregnancy, Cholestasis, Gestational Diabetes Mellitus, Class A2      Care Providers     Provider Service Role Specialty    Krystin Acosta MD -- Attending Provider Obstetrics and Gynecology      Your Allergies  Date Reviewed: 1/25/2017    No active allergies      Patient Belongings Returned     Document Return of Belongings Flowsheet     Were the patient bedside belongings sent home?   --   Belongings Retrieved from Security & Sent  Home   --    Belongings Sent to Safe   --   Medications Retrieved from Pharmacy & Sent Home   --              More Information      Postpartum Care After Vaginal Delivery  After you deliver your  (postpartum period), the usual stay in the hospital is 24-72 hours. If there were problems with your labor or delivery, or if you have other medical problems, you might be in the hospital longer.   While you are in the hospital, you will receive help and instructions on how to care for yourself and your  during the postpartum period.   While you are in the hospital:  · Be sure to tell your nurses if you have pain or discomfort, as well as where you feel the pain and what makes the pain worse.  · If you had an incision made near your vagina (episiotomy) or if you had some tearing during delivery, the nurses may put ice packs on your episiotomy or tear. The ice packs may help to reduce the pain and swelling.  · If you are breastfeeding, you may feel uncomfortable contractions of your uterus for a couple of weeks. This is normal. The contractions help your uterus get back to normal size.  · It is normal to have some bleeding after delivery.    For the first 1-3 days after delivery, the flow is red and the amount may be similar to a period.    It is common for the flow to start and stop.    In the first few days, you may pass some small clots. Let your nurses know if you begin to pass large clots or your flow increases.    Do not  flush blood clots down the toilet before having the nurse look at them.    During the next 3-10 days after delivery, your flow should become more watery and pink or brown-tinged in color.    Ten to fourteen days after delivery, your flow should be a small amount of yellowish-white discharge.    The amount of your flow will decrease over the first few weeks after delivery. Your flow may stop in 6-8 weeks. Most women have had their flow stop by 12 weeks after delivery.  · You should change  your sanitary pads frequently.  · Wash your hands thoroughly with soap and water for at least 20 seconds after changing pads, using the toilet, or before holding or feeding your .  · You should feel like you need to empty your bladder within the first 6-8 hours after delivery.  · In case you become weak, lightheaded, or faint, call your nurse before you get out of bed for the first time and before you take a shower for the first time.  · Within the first few days after delivery, your breasts may begin to feel tender and full. This is called engorgement. Breast tenderness usually goes away within 48-72 hours after engorgement occurs. You may also notice milk leaking from your breasts. If you are not breastfeeding, do not stimulate your breasts. Breast stimulation can make your breasts produce more milk.  · Spending as much time as possible with your  is very important. During this time, you and your  can feel close and get to know each other. Having your  stay in your room (rooming in) will help to strengthen the bond with your .  It will give you time to get to know your  and become comfortable caring for your .  · Your hormones change after delivery. Sometimes the hormone changes can temporarily cause you to feel sad or tearful. These feelings should not last more than a few days. If these feelings last longer than that, you should talk to your caregiver.  · If desired, talk to your caregiver about methods of family planning or contraception.  · Talk to your caregiver about immunizations. Your caregiver may want you to have the following immunizations before leaving the hospital:    Tetanus, diphtheria, and pertussis (Tdap) or tetanus and diphtheria (Td) immunization. It is very important that you and your family (including grandparents) or others caring for your  are up-to-date with the Tdap or Td immunizations. The Tdap or Td immunization can help protect  "your  from getting ill.    Rubella immunization.    Varicella (chickenpox) immunization.    Influenza immunization. You should receive this annual immunization if you did not receive the immunization during your pregnancy.     This information is not intended to replace advice given to you by your health care provider. Make sure you discuss any questions you have with your health care provider.     Document Released: 10/14/2008 Document Revised: 2013 Document Reviewed: 2013  [employer] Interactive Patient Education ©6 [employer]          Postpartum Depression and Baby Blues  The postpartum period begins right after the birth of a baby. During this time, there is often a great amount of ector and excitement. It is also a time of many changes in the life of the parents. Regardless of how many times a mother gives birth, each child brings new challenges and dynamics to the family. It is not unusual to have feelings of excitement along with confusing shifts in moods, emotions, and thoughts. All mothers are at risk of developing postpartum depression or the \"baby blues.\" These mood changes can occur right after giving birth, or they may occur many months after giving birth. The baby blues or postpartum depression can be mild or severe. Additionally, postpartum depression can go away rather quickly, or it can be a long-term condition.   CAUSES  Raised hormone levels and the rapid drop in those levels are thought to be a main cause of postpartum depression and the baby blues. A number of hormones change during and after pregnancy. Estrogen and progesterone usually decrease right after the delivery of your baby. The levels of thyroid hormone and various cortisol steroids also rapidly drop. Other factors that play a role in these mood changes include major life events and genetics.   RISK FACTORS  If you have any of the following risks for the baby blues or postpartum depression, know what symptoms to " watch out for during the postpartum period. Risk factors that may increase the likelihood of getting the baby blues or postpartum depression include:  · Having a personal or family history of depression.    · Having depression while being pregnant.    · Having premenstrual mood issues or mood issues related to oral contraceptives.  · Having a lot of life stress.    · Having marital conflict.    · Lacking a social support network.    · Having a baby with special needs.    · Having health problems, such as diabetes.    SIGNS AND SYMPTOMS  Symptoms of baby blues include:  · Brief changes in mood, such as going from extreme happiness to sadness.  · Decreased concentration.    · Difficulty sleeping.    · Crying spells, tearfulness.    · Irritability.    · Anxiety.    Symptoms of postpartum depression typically begin within the first month after giving birth. These symptoms include:  · Difficulty sleeping or excessive sleepiness.    · Marked weight loss.    · Agitation.    · Feelings of worthlessness.    · Lack of interest in activity or food.    Postpartum psychosis is a very serious condition and can be dangerous. Fortunately, it is rare. Displaying any of the following symptoms is cause for immediate medical attention. Symptoms of postpartum psychosis include:   · Hallucinations and delusions.    · Bizarre or disorganized behavior.    · Confusion or disorientation.    DIAGNOSIS   A diagnosis is made by an evaluation of your symptoms. There are no medical or lab tests that lead to a diagnosis, but there are various questionnaires that a health care provider may use to identify those with the baby blues, postpartum depression, or psychosis. Often, a screening tool called the [place]  Depression Scale is used to diagnose depression in the postpartum period.   TREATMENT  The baby blues usually goes away on its own in 1-2 weeks. Social support is often all that is needed. You will be encouraged to get adequate  sleep and rest. Occasionally, you may be given medicines to help you sleep.   Postpartum depression requires treatment because it can last several months or longer if it is not treated. Treatment may include individual or group therapy, medicine, or both to address any social, physiological, and psychological factors that may play a role in the depression. Regular exercise, a healthy diet, rest, and social support may also be strongly recommended.   Postpartum psychosis is more serious and needs treatment right away. Hospitalization is often needed.  HOME CARE INSTRUCTIONS  · Get as much rest as you can. Nap when the baby sleeps.    · Exercise regularly. Some women find yoga and walking to be beneficial.    · Eat a balanced and nourishing diet.    · Do little things that you enjoy. Have a cup of tea, take a bubble bath, read your favorite magazine, or listen to your favorite music.  · Avoid alcohol.    · Ask for help with household chores, cooking, grocery shopping, or running errands as needed. Do not try to do everything.    · Talk to people close to you about how you are feeling. Get support from your partner, family members, friends, or other new moms.  · Try to stay positive in how you think. Think about the things you are grateful for.    · Do not spend a lot of time alone.    · Only take over-the-counter or prescription medicine as directed by your health care provider.  · Keep all your postpartum appointments.    · Let your health care provider know if you have any concerns.    SEEK MEDICAL CARE IF:  You are having a reaction to or problems with your medicine.  SEEK IMMEDIATE MEDICAL CARE IF:  · You have suicidal feelings.    · You think you may harm the baby or someone else.  MAKE SURE YOU:  · Understand these instructions.  · Will watch your condition.  · Will get help right away if you are not doing well or get worse.     This information is not intended to replace advice given to you by your health care  provider. Make sure you discuss any questions you have with your health care provider.     Document Released: 09/21/2005 Document Revised: 12/23/2014 Document Reviewed: 09/29/2014  [employer] Interactive Patient Education ©2016 [employer]          Breastfeeding  Deciding to breastfeed is one of the best choices you can make for you and your baby. A change in hormones during pregnancy causes your breast tissue to grow and increases the number and size of your milk ducts. These hormones also allow proteins, sugars, and fats from your blood supply to make breast milk in your milk-producing glands. Hormones prevent breast milk from being released before your baby is born as well as prompt milk flow after birth. Once breastfeeding has begun, thoughts of your baby, as well as his or her sucking or crying, can stimulate the release of milk from your milk-producing glands.   BENEFITS OF BREASTFEEDING  For Your Baby  · Your first milk (colostrum) helps your baby's digestive system function better.  · There are antibodies in your milk that help your baby fight off infections.  · Your baby has a lower incidence of asthma, allergies, and sudden infant death syndrome.  · The nutrients in breast milk are better for your baby than infant formulas and are designed uniquely for your baby's needs.  · Breast milk improves your baby's brain development.  · Your baby is less likely to develop other conditions, such as childhood obesity, asthma, or type 2 diabetes mellitus.  For You  · Breastfeeding helps to create a very special bond between you and your baby.  · Breastfeeding is convenient. Breast milk is always available at the correct temperature and costs nothing.  · Breastfeeding helps to burn calories and helps you lose the weight gained during pregnancy.  · Breastfeeding makes your uterus contract to its prepregnancy size faster and slows bleeding (lochia) after you give birth.    · Breastfeeding helps to lower your risk of  developing type 2 diabetes mellitus, osteoporosis, and breast or ovarian cancer later in life.  SIGNS THAT YOUR BABY IS HUNGRY  Early Signs of Hunger  · Increased alertness or activity.  · Stretching.  · Movement of the head from side to side.  · Movement of the head and opening of the mouth when the corner of the mouth or cheek is stroked (rooting).  · Increased sucking sounds, smacking lips, cooing, sighing, or squeaking.  · Hand-to-mouth movements.  · Increased sucking of fingers or hands.  Late Signs of Hunger  · Fussing.  · Intermittent crying.  Extreme Signs of Hunger  Signs of extreme hunger will require calming and consoling before your baby will be able to breastfeed successfully. Do not wait for the following signs of extreme hunger to occur before you initiate breastfeeding:  · Restlessness.  · A loud, strong cry.  · Screaming.  BREASTFEEDING BASICS  Breastfeeding Initiation  · Find a comfortable place to sit or lie down, with your neck and back well supported.  · Place a pillow or rolled up blanket under your baby to bring him or her to the level of your breast (if you are seated). Nursing pillows are specially designed to help support your arms and your baby while you breastfeed.  · Make sure that your baby's abdomen is facing your abdomen.  · Gently massage your breast. With your fingertips, massage from your chest wall toward your nipple in a circular motion. This encourages milk flow. You may need to continue this action during the feeding if your milk flows slowly.  · Support your breast with 4 fingers underneath and your thumb above your nipple. Make sure your fingers are well away from your nipple and your baby's mouth.  · Stroke your baby's lips gently with your finger or nipple.  · When your baby's mouth is open wide enough, quickly bring your baby to your breast, placing your entire nipple and as much of the colored area around your nipple (areola) as possible into your baby's mouth.    More  areola should be visible above your baby's upper lip than below the lower lip.    Your baby's tongue should be between his or her lower gum and your breast.  · Ensure that your baby's mouth is correctly positioned around your nipple (latched). Your baby's lips should create a seal on your breast and be turned out (everted).  · It is common for your baby to suck about 2-3 minutes in order to start the flow of breast milk.  Latching  Teaching your baby how to latch on to your breast properly is very important. An improper latch can cause nipple pain and decreased milk supply for you and poor weight gain in your baby. Also, if your baby is not latched onto your nipple properly, he or she may swallow some air during feeding. This can make your baby fussy. Burping your baby when you switch breasts during the feeding can help to get rid of the air. However, teaching your baby to latch on properly is still the best way to prevent fussiness from swallowing air while breastfeeding.  Signs that your baby has successfully latched on to your nipple:  · Silent tugging or silent sucking, without causing you pain.  · Swallowing heard between every 3-4 sucks.  · Muscle movement above and in front of his or her ears while sucking.  Signs that your baby has not successfully latched on to nipple:  · Sucking sounds or smacking sounds from your baby while breastfeeding.  · Nipple pain.  If you think your baby has not latched on correctly, slip your finger into the corner of your baby's mouth to break the suction and place it between your baby's gums. Attempt breastfeeding initiation again.  Signs of Successful Breastfeeding  Signs from your baby:  · A gradual decrease in the number of sucks or complete cessation of sucking.  · Falling asleep.  · Relaxation of his or her body.  · Retention of a small amount of milk in his or her mouth.  · Letting go of your breast by himself or herself.  Signs from you:  · Breasts that have increased in  "firmness, weight, and size 1-3 hours after feeding.  · Breasts that are softer immediately after breastfeeding.  · Increased milk volume, as well as a change in milk consistency and color by the fifth day of breastfeeding.  · Nipples that are not sore, cracked, or bleeding.  Signs That Your Baby is Getting Enough Milk  · Wetting at least 3 diapers in a 24-hour period. The urine should be clear and pale yellow by age 5 days.  · At least 3 stools in a 24-hour period by age 5 days. The stool should be soft and yellow.  · At least 3 stools in a 24-hour period by age 7 days. The stool should be seedy and yellow.  · No loss of weight greater than 10% of birth weight during the first 3 days of age.  · Average weight gain of 4-7 ounces (113-198 g) per week after age 4 days.  · Consistent daily weight gain by age 5 days, without weight loss after the age of 2 weeks.  After a feeding, your baby may spit up a small amount. This is common.  BREASTFEEDING FREQUENCY AND DURATION  Frequent feeding will help you make more milk and can prevent sore nipples and breast engorgement. Breastfeed when you feel the need to reduce the fullness of your breasts or when your baby shows signs of hunger. This is called \"breastfeeding on demand.\" Avoid introducing a pacifier to your baby while you are working to establish breastfeeding (the first 4-6 weeks after your baby is born). After this time you may choose to use a pacifier. Research has shown that pacifier use during the first year of a baby's life decreases the risk of sudden infant death syndrome (SIDS).  Allow your baby to feed on each breast as long as he or she wants. Breastfeed until your baby is finished feeding. When your baby unlatches or falls asleep while feeding from the first breast, offer the second breast. Because newborns are often sleepy in the first few weeks of life, you may need to awaken your baby to get him or her to feed.  Breastfeeding times will vary from baby to " baby. However, the following rules can serve as a guide to help you ensure that your baby is properly fed:  · Newborns (babies 4 weeks of age or younger) may breastfeed every 1-3 hours.  · Newborns should not go longer than 3 hours during the day or 5 hours during the night without breastfeeding.  · You should breastfeed your baby a minimum of 8 times in a 24-hour period until you begin to introduce solid foods to your baby at around 6 months of age.  BREAST MILK PUMPING  Pumping and storing breast milk allows you to ensure that your baby is exclusively fed your breast milk, even at times when you are unable to breastfeed. This is especially important if you are going back to work while you are still breastfeeding or when you are not able to be present during feedings. Your lactation consultant can give you guidelines on how long it is safe to store breast milk.  A breast pump is a machine that allows you to pump milk from your breast into a sterile bottle. The pumped breast milk can then be stored in a refrigerator or freezer. Some breast pumps are operated by hand, while others use electricity. Ask your lactation consultant which type will work best for you. Breast pumps can be purchased, but some hospitals and breastfeeding support groups lease breast pumps on a monthly basis. A lactation consultant can teach you how to hand express breast milk, if you prefer not to use a pump.  CARING FOR YOUR BREASTS WHILE YOU BREASTFEED  Nipples can become dry, cracked, and sore while breastfeeding. The following recommendations can help keep your breasts moisturized and healthy:  · Avoid using soap on your nipples.  · Wear a supportive bra. Although not required, special nursing bras and tank tops are designed to allow access to your breasts for breastfeeding without taking off your entire bra or top. Avoid wearing underwire-style bras or extremely tight bras.  · Air dry your nipples for 3-4 minutes after each feeding.  · Use  only cotton bra pads to absorb leaked breast milk. Leaking of breast milk between feedings is normal.  · Use lanolin on your nipples after breastfeeding. Lanolin helps to maintain your skin's normal moisture barrier. If you use pure lanolin, you do not need to wash it off before feeding your baby again. Pure lanolin is not toxic to your baby. You may also hand express a few drops of breast milk and gently massage that milk into your nipples and allow the milk to air dry.  In the first few weeks after giving birth, some women experience extremely full breasts (engorgement). Engorgement can make your breasts feel heavy, warm, and tender to the touch. Engorgement peaks within 3-5 days after you give birth. The following recommendations can help ease engorgement:  · Completely empty your breasts while breastfeeding or pumping. You may want to start by applying warm, moist heat (in the shower or with warm water-soaked hand towels) just before feeding or pumping. This increases circulation and helps the milk flow. If your baby does not completely empty your breasts while breastfeeding, pump any extra milk after he or she is finished.  · Wear a snug bra (nursing or regular) or tank top for 1-2 days to signal your body to slightly decrease milk production.  · Apply ice packs to your breasts, unless this is too uncomfortable for you.  · Make sure that your baby is latched on and positioned properly while breastfeeding.  If engorgement persists after 48 hours of following these recommendations, contact your health care provider or a lactation consultant.  OVERALL HEALTH CARE RECOMMENDATIONS WHILE BREASTFEEDING  · Eat healthy foods. Alternate between meals and snacks, eating 3 of each per day. Because what you eat affects your breast milk, some of the foods may make your baby more irritable than usual. Avoid eating these foods if you are sure that they are negatively affecting your baby.  · Drink milk, fruit juice, and water  to satisfy your thirst (about 10 glasses a day).  · Rest often, relax, and continue to take your prenatal vitamins to prevent fatigue, stress, and anemia.  · Continue breast self-awareness checks.  · Avoid chewing and smoking tobacco. Chemicals from cigarettes that pass into breast milk and exposure to secondhand smoke may harm your baby.  · Avoid alcohol and drug use, including marijuana.  Some medicines that may be harmful to your baby can pass through breast milk. It is important to ask your health care provider before taking any medicine, including all over-the-counter and prescription medicine as well as vitamin and herbal supplements.  It is possible to become pregnant while breastfeeding. If birth control is desired, ask your health care provider about options that will be safe for your baby.  SEEK MEDICAL CARE IF:  · You feel like you want to stop breastfeeding or have become frustrated with breastfeeding.  · You have painful breasts or nipples.  · Your nipples are cracked or bleeding.  · Your breasts are red, tender, or warm.  · You have a swollen area on either breast.  · You have a fever or chills.  · You have nausea or vomiting.  · You have drainage other than breast milk from your nipples.  · Your breasts do not become full before feedings by the fifth day after you give birth.  · You feel sad and depressed.  · Your baby is too sleepy to eat well.  · Your baby is having trouble sleeping.    · Your baby is wetting less than 3 diapers in a 24-hour period.  · Your baby has less than 3 stools in a 24-hour period.  · Your baby's skin or the white part of his or her eyes becomes yellow.    · Your baby is not gaining weight by 5 days of age.  SEEK IMMEDIATE MEDICAL CARE IF:  · Your baby is overly tired (lethargic) and does not want to wake up and feed.  · Your baby develops an unexplained fever.     This information is not intended to replace advice given to you by your health care provider. Make sure you  "discuss any questions you have with your health care provider.     Document Released: 12/18/2006 Document Revised: 09/07/2016 Document Reviewed: 06/11/2014  [employer] Interactive Patient Education ©2016 [employer]          During breastfeeding, your body burns about 400-500 calories each day. It is important that you replace these burned calories by consuming a variety of healthy foods. There is no need to follow a special diet while breastfeeding your baby. Focus on making healthy choices to help support and maintain your milk production. To maintain a good milk supply and stay nourished yourself, it is also important that you drink plenty of water.  WHAT DO I NEED TO KNOW ABOUT EATING DURING BREASTFEEDING?  Medicines  · Continue to take your prenatal vitamins and any other supplements as directed by your health care provider.  · Talk with your health care provider about any medicines that you are taking. Certain medicines may slow or delay your milk production and may be harmful to your baby.  Amount and Variety  · Consume about 500 extra calories each day to maintain your milk supply.  · Drink plenty of water, at least 64 oz per day or as directed by your health care provider. Try to drink at least 8 oz of water each time that you breastfeed. It is best to drink water before you feel thirsty. Your urine should be clear or pale yellow in color. If you notice that your urine is dark yellow, drink more water.  · Continue to follow a well-balanced, healthy diet that includes healthy snacks. The taste of your milk will be affected by what you eat. Eating different foods will expose your baby to different tastes, which may help your baby to accept solid foods more easily later on.  What to Limit or Avoid  · Limit your overall intake of foods that have \"empty calories.\" These are foods that have little nutritional value, such as sweets, desserts, candies, sugar-sweetened beverages, and fried foods.  · Avoid drinking " large amounts of caffeine or alcohol.    Avoid drinking more than 2-3 cups (16-24 oz) of caffeinated drinks in a day. Caffeine is dehydrating. It will also enter your breast milk, and this might bother your baby or interfere with his or her sleep.    Avoid drinking more than one alcoholic drink per day, such as a 5-oz glass of wine, one 12-oz beer, or one standard cocktail. It may be best to wait to have your first alcoholic drink until your breastfeeding has been well established, which is usually after 2-3 months. After you have an alcoholic drink, wait at least 4 hours before you breastfeed. As an alternative, you may pump (express) your breast milk before you drink alcohol. Then, you can feed that milk to your baby at a later time.  · Certain foods may cause you or your baby to have increased gas and may cause fussiness in your baby. If you notice increased gas or fussiness in your baby when you eat these foods, you may want to avoid them while breastfeeding. These may include:    Chocolate.    Spicy foods.    Vegetables such as broccoli, cauliflower, cabbage, onions, or [place] sprouts.  Food Safety  · To prevent foodborne illness, practice good food safety and cleanliness, such as washing your hands before you eat and after you prepare raw meat.  WHAT FOODS CAN I EAT?  Grains  All grains are okay to eat. Try to choose whole grains, such as whole wheat bread, oatmeal, or brown rice.  Vegetables  All vegetables are okay to eat. Try to eat a variety of colors and types of vegetables to get a full range of vitamins and minerals. Remember to wash your vegetables well before eating.  Fruits  All fruits are okay to eat. Try to eat a variety of colors and types of fruit to get a full range of vitamins and minerals. Remember to wash your fruits well before eating.  Meats and Other Protein Sources  Lean meats are okay to eat. Try to eat chicken, turkey, fish, and lean cuts of beef, veal, or pork. If you choose fish,  choose fish that are low in mercury, such as salmon, canned light tuna, and catfish. You can also eat seafood such as shrimp, crab, and lobster. Other good protein sources include tofu, tempeh, beans, eggs, peanut butter, and other nut butters.  Dairy  Dairy is okay to eat. Continue to drink milk and milk alternatives, or eat yogurt, cheese, cottage cheese, or sour cream.  Beverages  Most beverages are okay.  Condiments  All condiments are okay.  Sweets and Desserts  All sweets and desserts are okay.  Fats and Oils  All fats and oils are okay.  The items listed above may not be a complete list of recommended foods or beverages. Contact your dietitian for more options.  WHAT FOODS ARE NOT RECOMMENDED?  Meats and Other Protein Sources  Avoid eating fish with high mercury content, such as tilefish, shark, swordfish, and amber mackerel.  Beverages  Avoid drinking sugar-sweetened beverages such as sodas, teas, or energy drinks. Limit the amount of caffeine and alcohol that you drink.  The items listed above may not be a complete list of foods and beverages to avoid. Contact your dietitian for more information.     This information is not intended to replace advice given to you by your health care provider. Make sure you discuss any questions you have with your health care provider.     Document Released: 08/21/2015 Document Reviewed: 08/21/2015  [employer] Interactive Patient Education ©2016 [employer]          Breastfeeding Challenges and Solutions  Even though breastfeeding is natural, it can be challenging, especially in the first few weeks after childbirth. It is normal for problems to arise when starting to breastfeed your new baby, even if you have  before. This document provides some solutions to the most common breastfeeding challenges.   CHALLENGES AND SOLUTIONS  Challenge--Cracked or Sore Nipples  Cracked or sore nipples are commonly experienced by breastfeeding mothers. Cracked or sore nipples often  are caused by inadequate latching (when your baby's mouth attaches to your breast to breastfeed). Soreness can also happen if your baby is not positioned properly at your breast. Although nipple cracking and soreness are common during the first week after birth, nipple pain is never normal. If you experience nipple cracking or soreness that lasts longer than 1 week or nipple pain, call your health care provider or lactation consultant.   Solution  Ensure proper latching and positioning of your baby by following the steps below:  · Find a comfortable place to sit or lie down, with your neck and back well supported.  · Place a pillow or rolled up blanket under your baby to bring him or her to the level of your breast (if you are seated).  · Make sure that your baby's abdomen is facing your abdomen.  · Gently massage your breast. With your fingertips, massage from your chest wall toward your nipple in a circular motion. This encourages milk flow. You may need to continue this action during the feeding if your milk flows slowly.  · Support your breast with 4 fingers underneath and your thumb above your nipple. Make sure your fingers are well away from your nipple and your baby's mouth.  · Stroke your baby's lips gently with your finger or nipple.  · When your baby's mouth is open wide enough, quickly bring your baby to your breast, placing your entire nipple and as much of the colored area around your nipple (areola) as possible into your baby's mouth.  ¨ More areola should be visible above your baby's upper lip than below the lower lip.  ¨ Your baby's tongue should be between his or her lower gum and your breast.  · Ensure that your baby's mouth is correctly positioned around your nipple (latched). Your baby's lips should create a seal on your breast and be turned out (everted).  · It is common for your baby to suck for about 2-3 minutes in order to start the flow of breast milk.  Signs that your baby has successfully  "latched on to your nipple include:   · Quietly tugging or quietly sucking without causing you pain.    · Swallowing heard between every 3-4 sucks.    · Muscle movement above and in front of his or her ears with sucking.    Signs that your baby has not successfully latched on to nipple include:   · Sucking sounds or smacking sounds from your baby while nursing.    · Nipple pain.    Ensure that your breasts stay moisturized and healthy by:  · Avoiding the use of soap on your nipples.    · Wearing a supportive bra. Avoid wearing underwire-style bras or tight bras.    · Air drying your nipples for 3-4 minutes after each feeding.    · Using only cotton bra pads to absorb breast milk leakage. Leaking of breast milk between feedings is normal.  Be sure to change the pads if they become soaked with milk.  · Using lanolin on your nipples after nursing. Lanolin helps to maintain your skin's normal moisture barrier. If you use pure lanolin you do not need to wash it off before feeding your baby again. Pure lanolin is not toxic to your baby.  You may also hand express a few drops of breast milk and gently massage that milk into your nipples, allowing it to air dry.  Challenge--Breast Engorgement  Breast engorgement is the overfilling of your breasts with breast milk. In the first few weeks after giving birth, you may experience breast engorgement. Breast engorgement can make your breasts throb and feel hard, tightly stretched, warm, and tender. Engorgement peaks about the fifth day after you give birth. Having breast engorgement does not mean you have to stop breastfeeding your baby.  Solution  · Breastfeed when you feel the need to reduce the fullness of your breasts or when your baby shows signs of hunger. This is called \"breastfeeding on demand.\"  · Newborns (babies younger than 4 weeks) often breastfeed every 1-3 hours during the day. You may need to awaken your baby to feed if he or she is asleep at a feeding time.  · Do " "not allow your baby to sleep longer than 5 hours during the night without a feeding.  · Pump or hand express breast milk before breastfeeding to soften your breast, areola, and nipple.  · Apply warm, moist heat (in the shower or with warm water-soaked hand towels) just before feeding or pumping, or massage your breast before or during breastfeeding. This increases circulation and helps your milk to flow.  · Completely empty your breasts when breastfeeding or pumping. Afterward, wear a snug bra (nursing or regular) or tank top for 1-2 days to signal your body to slightly decrease milk production. Only wear snug bras or tank tops to treat engorgement. Tight bras typically should be avoided by breastfeeding mothers. Once engorgement is relieved, return to wearing regular, loose-fitting clothes.  · Apply ice packs to your breasts to lessen the pain from engorgement and relieve swelling, unless the ice is uncomfortable for you.  · Do not delay feedings. Try to relax when it is time to feed your baby. This helps to trigger your \"let-down reflex,\" which releases milk from your breast.  · Ensure your baby is latched on to your breast and positioned properly while breastfeeding.  · Allow your baby to remain at your breast as long as he or she is latched on well and actively sucking. Your baby will let you know when he or she is done breastfeeding by pulling away from your breast or falling asleep.  · Avoid introducing bottles or pacifiers to your baby in the early weeks of breastfeeding. Wait to introduce these things until after resolving any breastfeeding challenges.  · Try to pump your milk on the same schedule as when your baby would breastfeed if you are returning to work or away from home for an extended period.  · Drink plenty of fluids to avoid dehydration, which can eventually put you at greater risk of breast engorgement.  If you follow these suggestions, your engorgement should improve in 24-48 hours. If you are " still experiencing difficulty, call your lactation consultant or health care provider.   Challenge--Plugged Milk Ducts  Plugged milk ducts occur when the duct does not drain milk effectively and becomes swollen. Wearing a tight-fitting nursing bra or having difficulty with latching may cause plugged milk ducts. Not drinking enough water (8-10 c [1.9-2.4 L] per day) can contribute to plugged milk ducts. Once a duct has become plugged, hard lumps, soreness, and redness may develop in your breast.   Solution  Do not delay feedings. Feed your baby frequently and try to empty your breasts of milk at each feeding. Try breastfeeding from the affected side first so there is a better chance that the milk will drain completely from that breast. Apply warm, moist towels to your breasts for 5-10 minutes before feeding. Alternatively, a hot shower right before breastfeeding can provide the moist heat that can encourage milk flow. Gentle massage of the sore area before and during a feeding may also help. Avoid wearing tight clothing or bras that put pressure on your breasts. Wear bras that offer good support to your breasts, but avoid underwire bras. If you have a plugged milk duct and develop a fever, you need to see your health care provider.   Challenge--Mastitis  Mastitis is inflammation of your breast. It usually is caused by a bacterial infection and can cause flu-like symptoms. You may develop redness in your breast and a fever. Often when mastitis occurs, your breast becomes firm, warm, and very painful. The most common causes of mastitis are poor latching, ineffective sucking from your baby, consistent pressure on your breast (possibly from wearing a tight-fitting bra or shirt that restricts the milk flow), unusual stress or fatigue, or missed feedings.   Solution  You will be given antibiotic medicine to treat the infection. It is still important to breastfeed frequently to empty your breasts. Continuing to breastfeed  while you recover from mastitis will not harm your baby. Make sure your baby is positioned properly during every feeding. Apply moist heat to your breasts for a few minutes before feeding to help the milk flow and to help your breasts empty more easily.  Challenge--Thrush  Thrush is a yeast infection that can form on your nipples, in your breast, or in your baby's mouth. It causes itching, soreness, burning or stabbing pain, and sometimes a rash.   Solution  You will be given a medicated ointment for your nipples, and your baby will be given a liquid medicine for his or her mouth. It is important that you and your baby are treated at the same time because thrush can be passed between you and your baby. Change disposable nursing pads often. Any bras, towels, or clothing that come in contact with infected areas of your body or your baby's body need to be washed in very hot water every day. Wash your hands and your baby's hands often. All pacifiers, bottle nipples, or toys your baby puts in his or her mouth should be boiled once a day for 20 minutes. After 1 week of treatment, discard pacifiers and bottle nipples and buy new ones. All breast pump parts that touch the milk need to be boiled for 20 minutes every day.  Challenge--Low Milk Supply  You may not be producing enough milk if your baby is not gaining the proper amount of weight. Breast milk production is based on a supply-and-demand system. Your milk supply depends on how frequently and effectively your baby empties your breast.   Solution  The more you breastfeed and pump, the more breast milk you will produce. It is important that your baby empties at least one of your breasts at each feeding. If this is not happening, then use a breast pump or hand express any milk that remains. This will help to drain as much milk as possible at each feeding. It will also signal your body to produce more milk. If your baby is not emptying your breasts, it may be due to  latching, sucking, or positioning problems. If low milk supply continues after addressing these issues, contact your health care provider or a lactation specialist as soon as possible.  Challenge--Inverted or Flat Nipples  Some women have nipples that turn inward instead of protruding outward. Other women have nipples that are flat. Inverted or flat nipples can sometimes make it more difficult for your baby to latch onto your breast.  Solution  You may be given a small device that pulls out inverted nipples. This device should be applied right before your baby is brought to your breast. You can also try using a breast pump for a short time before placing the baby at your breast. The pump can pull your nipple outwards to help your infant latch more easily. The baby's sucking motion will help the inverted nipple protrude as well.   If you have flat nipples, encourage your baby to latch onto your breast and feed frequently in the early days after birth. This will give your baby practice latching on correctly while your breast is still soft. When your milk supply increases, between the second and fifth day after birth and your breasts become full, your baby will have an easier time latching.   Contact a lactation consultant if you still have concerns. She or he can teach you additional techniques to address breastfeeding problems related to nipple shape and position.   FOR MORE INFORMATION  [employer]: www.llli.org     This information is not intended to replace advice given to you by your health care provider. Make sure you discuss any questions you have with your health care provider.     Document Released: 06/11/2007 Document Revised: 01/08/2016 Document Reviewed: 06/13/2014  [employer] Interactive Patient Education ©2016 [employer]          Breast Engorgement  Breast engorgement is the overfilling of your breasts with breast milk. In the first few weeks after giving birth, you may experience  breast engorgement. Although it is normal for your breasts to feel heavy, full, and uncomfortable within 3-5 days of giving birth, breast engorgement can make your breasts throb and feel hard, tightly stretched, warm, and tender. Engorgement peaks about the fifth day after you give birth. Breast engorgement can be easily treated and does not require you to stop breastfeeding.   CAUSES OF BREAST ENGORGEMENT  Some women delay feedings because of sore or cracked nipples, which can lead to engorgement. Cracked and sore nipples often are caused by inadequate latching (when your baby's mouth attaches to your breast to breastfeed). If your baby is latched on properly, he or she should be able to breastfeed as long as needed, without causing any pain. If you do feel pain while breastfeeding, take your baby off your breast and try again. Get help from your health care provider or a lactation consultant if you continue to have pain.  Other causes of engorgement include:   · Improper position of your baby while breastfeeding.  · Allowing too much time to pass between feedings.  · Reduction in breastfeeding because you give your baby water, juice, formula, breast milk from a bottle, or a pacifier instead of breastfeeding.  · Changes in your baby's feeding patterns.  · Weak sucking from your baby, which causes less milk to be taken out of your breast during feedings.  · Fatigue, stress, anemia.  · Plugged milk ducts.  · A history of breast surgery.  SIGNS AND SYMPTOMS OF BREAST ENGORGEMENT  If your breasts become engorged, you may experience:   · Breast swelling, tenderness, warmth, redness, or throbbing.  · Breast hardness and stretching of the skin around your breast.  · Flattening, tightening, and hardening of your nipple.  · A low-grade fever, which can be confused with a breast infection.  RECOMMENDATIONS TO EASE BREAST ENGORGEMENT  Breast engorgement should improve in 24-48 hours after following these  "recommendations:  · Breastfeed when you feel the need to reduce the fullness of your breasts or when your baby shows signs of hunger. This is called \"breastfeeding on demand.\"  · Newborns (babies younger than 4 weeks) often breastfeed every 1-3 hours during the day. You may need to awaken your baby to feed if he or she is asleep at a feeding time.  · Do not allow your baby to sleep longer than 5 hours during the night without a feeding.  · Pump or hand-express breast milk before breastfeeding to soften your breast, areola, and nipple.    · Apply warm, moist heat (in the shower or with warm water-soaked hand towels) just before feeding or pumping, or massage your breast before or during breastfeeding. This increases circulation and helps your milk to flow.  · Completely empty your breasts when breastfeeding or pumping. Afterward, wear a snug bra (nursing or regular) or tank top for 1-2 days to signal your body to slightly decrease milk production. Only wear snug bras or tank tops to treat engorgement. Tight bras typically should be avoided by breastfeeding mothers. Once engorgement is relieved, return to wearing regular, loose-fitting clothes.  · Apply ice packs to your breasts to lessen the pain from engorgement and relieve swelling, unless the ice is uncomfortable for you.  · Do not delay feedings. Try to relax when it is time to feed your baby. This helps to trigger your \"let-down reflex,\" which releases milk from your breast.  · Ensure your baby is latched on to your breast and positioned properly while breastfeeding.    · Allow your baby to remain at your breast as long as he or she is latched on well and actively sucking. Your baby will let you know when he or she is done breastfeeding by pulling away from your breast or falling asleep.  · Avoid introducing bottles or pacifiers to your baby in the early weeks of breastfeeding. Wait to introduce these things until after resolving any breastfeeding " challenges.  · Try to pump your milk on the same schedule as when your baby would breastfeed if you are returning to work or away from home for an extended period.  · Drink plenty of fluids to avoid dehydration, which can eventually put you at greater risk of breast engorgement.    CALL YOUR HEALTH CARE PROVIDER OR LACTATION CONSULTANT IF:   · Engorgement lasts longer than 2 days, even after treatment.  · You have flu-like symptoms, such as a fever, chills, or body aches.  · Your breasts become increasingly red and painful.     This information is not intended to replace advice given to you by your health care provider. Make sure you discuss any questions you have with your health care provider.     Document Released: 04/14/2006 Document Revised: 01/08/2016 Document Reviewed: 06/12/2014  [employer] Interactive Patient Education ©2016 [employer]          Ibuprofen tablets and capsules  What is this medicine?  IBUPROFEN (eye BYOO proe fen) is a non-steroidal anti-inflammatory drug (NSAID). It is used for dental pain, fever, headaches or migraines, osteoarthritis, rheumatoid arthritis, or painful monthly periods. It can also relieve minor aches and pains caused by a cold, flu, or sore throat.  This medicine may be used for other purposes; ask your health care provider or pharmacist if you have questions.  What should I tell my health care provider before I take this medicine?  They need to know if you have any of these conditions:  -asthma  -cigarette smoker  -drink more than 3 alcohol containing drinks a day  -heart disease or circulation problems such as heart failure or leg edema (fluid retention)  -high blood pressure  -kidney disease  -liver disease  -stomach bleeding or ulcers  -an unusual or allergic reaction to ibuprofen, aspirin, other NSAIDS, other medicines, foods, dyes, or preservatives  -pregnant or trying to get pregnant  -breast-feeding  How should I use this medicine?  Take this medicine by mouth with  a glass of water. Follow the directions on the prescription label. Take this medicine with food if your stomach gets upset. Try to not lie down for at least 10 minutes after you take the medicine. Take your medicine at regular intervals. Do not take your medicine more often than directed.  A special MedGuide will be given to you by the pharmacist with each prescription and refill. Be sure to read this information carefully each time.  Talk to your pediatrician regarding the use of this medicine in children. Special care may be needed.  Overdosage: If you think you have taken too much of this medicine contact a poison control center or emergency room at once.  NOTE: This medicine is only for you. Do not share this medicine with others.  What if I miss a dose?  If you miss a dose, take it as soon as you can. If it is almost time for your next dose, take only that dose. Do not take double or extra doses.  What may interact with this medicine?  Do not take this medicine with any of the following medications:  -cidofovir  -ketorolac  -methotrexate  -pemetrexed  This medicine may also interact with the following medications:  -alcohol  -aspirin  -diuretics  -lithium  -other drugs for inflammation like prednisone  -warfarin  This list may not describe all possible interactions. Give your health care provider a list of all the medicines, herbs, non-prescription drugs, or dietary supplements you use. Also tell them if you smoke, drink alcohol, or use illegal drugs. Some items may interact with your medicine.  What should I watch for while using this medicine?  Tell your doctor or healthcare professional if your symptoms do not start to get better or if they get worse.  This medicine does not prevent heart attack or stroke. In fact, this medicine may increase the chance of a heart attack or stroke. The chance may increase with longer use of this medicine and in people who have heart disease. If you take aspirin to prevent  heart attack or stroke, talk with your doctor or health care professional.  Do not take other medicines that contain aspirin, ibuprofen, or naproxen with this medicine. Side effects such as stomach upset, nausea, or ulcers may be more likely to occur. Many medicines available without a prescription should not be taken with this medicine.  This medicine can cause ulcers and bleeding in the stomach and intestines at any time during treatment. Ulcers and bleeding can happen without warning symptoms and can cause death. To reduce your risk, do not smoke cigarettes or drink alcohol while you are taking this medicine.  You may get drowsy or dizzy. Do not drive, use machinery, or do anything that needs mental alertness until you know how this medicine affects you. Do not stand or sit up quickly, especially if you are an older patient. This reduces the risk of dizzy or fainting spells.  This medicine can cause you to bleed more easily. Try to avoid damage to your teeth and gums when you brush or floss your teeth.  This medicine may be used to treat migraines. If you take migraine medicines for 10 or more days a month, your migraines may get worse. Keep a diary of headache days and medicine use. Contact your healthcare professional if your migraine attacks occur more frequently.  What side effects may I notice from receiving this medicine?  Side effects that you should report to your doctor or health care professional as soon as possible:  -allergic reactions like skin rash, itching or hives, swelling of the face, lips, or tongue  -severe stomach pain  -signs and symptoms of bleeding such as bloody or black, tarry stools; red or dark-brown urine; spitting up blood or brown material that looks like coffee grounds; red spots on the skin; unusual bruising or bleeding from the eye, gums, or nose  -signs and symptoms of a blood clot such as changes in vision; chest pain; severe, sudden headache; trouble speaking; sudden numbness  or weakness of the face, arm, or leg  -unexplained weight gain or swelling  -unusually weak or tired  -yellowing of eyes or skin  Side effects that usually do not require medical attention (report to your doctor or health care professional if they continue or are bothersome):  -bruising  -diarrhea  -dizziness, drowsiness  -headache  -nausea, vomiting  This list may not describe all possible side effects. Call your doctor for medical advice about side effects. You may report side effects to FDA at 8-642-FDA-8292.  Where should I keep my medicine?  Keep out of the reach of children.  Store at room temperature between 15 and 30 degrees C (59 and 86 degrees F). Keep container tightly closed. Throw away any unused medicine after the expiration date.  NOTE: This sheet is a summary. It may not cover all possible information. If you have questions about this medicine, talk to your doctor, pharmacist, or health care provider.     © 2016, [employer]vier[employer]. (2014-08-19 10:48:02)          Acetaminophen; Oxycodone tablets  What is this medicine?  ACETAMINOPHEN; OXYCODONE (a set a KAREEN rosa fen; ox i KOE done) is a pain reliever. It is used to treat moderate to severe pain.  This medicine may be used for other purposes; ask your health care provider or pharmacist if you have questions.  What should I tell my health care provider before I take this medicine?  They need to know if you have any of these conditions:  -brain tumor  -Crohn's disease, inflammatory bowel disease, or ulcerative colitis  -drug abuse or addiction  -head injury  -heart or circulation problems  -if you often drink alcohol  -kidney disease or problems going to the bathroom  -liver disease  -lung disease, asthma, or breathing problems  -an unusual or allergic reaction to acetaminophen, oxycodone, other opioid analgesics, other medicines, foods, dyes, or preservatives  -pregnant or trying to get pregnant  -breast-feeding  How should I use this  medicine?  Take this medicine by mouth with a full glass of water. Follow the directions on the prescription label. You can take it with or without food. If it upsets your stomach, take it with food. Take your medicine at regular intervals. Do not take it more often than directed.  Talk to your pediatrician regarding the use of this medicine in children. Special care may be needed.  Patients over 65 years old may have a stronger reaction and need a smaller dose.  Overdosage: If you think you have taken too much of this medicine contact a poison control center or emergency room at once.  NOTE: This medicine is only for you. Do not share this medicine with others.  What if I miss a dose?  If you miss a dose, take it as soon as you can. If it is almost time for your next dose, take only that dose. Do not take double or extra doses.  What may interact with this medicine?  -alcohol  -antihistamines  -barbiturates like amobarbital, butalbital, butabarbital, methohexital, pentobarbital, phenobarbital, thiopental, and secobarbital  -benztropine  -drugs for bladder problems like solifenacin, trospium, oxybutynin, tolterodine, hyoscyamine, and methscopolamine  -drugs for breathing problems like ipratropium and tiotropium  -drugs for certain stomach or intestine problems like propantheline, homatropine methylbromide, glycopyrrolate, atropine, belladonna, and dicyclomine  -general anesthetics like etomidate, ketamine, nitrous oxide, propofol, desflurane, enflurane, halothane, isoflurane, and sevoflurane  -medicines for depression, anxiety, or psychotic disturbances  -medicines for sleep  -muscle relaxants  -naltrexone  -narcotic medicines (opiates) for pain  -phenothiazines like perphenazine, thioridazine, chlorpromazine, mesoridazine, fluphenazine, prochlorperazine, promazine, and trifluoperazine  -scopolamine  -tramadol  -trihexyphenidyl  This list may not describe all possible interactions. Give your health care provider a  list of all the medicines, herbs, non-prescription drugs, or dietary supplements you use. Also tell them if you smoke, drink alcohol, or use illegal drugs. Some items may interact with your medicine.  What should I watch for while using this medicine?  Tell your doctor or health care professional if your pain does not go away, if it gets worse, or if you have new or a different type of pain. You may develop tolerance to the medicine. Tolerance means that you will need a higher dose of the medication for pain relief. Tolerance is normal and is expected if you take this medicine for a long time.  Do not suddenly stop taking your medicine because you may develop a severe reaction. Your body becomes used to the medicine. This does NOT mean you are addicted. Addiction is a behavior related to getting and using a drug for a non-medical reason. If you have pain, you have a medical reason to take pain medicine. Your doctor will tell you how much medicine to take. If your doctor wants you to stop the medicine, the dose will be slowly lowered over time to avoid any side effects.  You may get drowsy or dizzy. Do not drive, use machinery, or do anything that needs mental alertness until you know how this medicine affects you. Do not stand or sit up quickly, especially if you are an older patient. This reduces the risk of dizzy or fainting spells. Alcohol may interfere with the effect of this medicine. Avoid alcoholic drinks.  There are different types of narcotic medicines (opiates) for pain. If you take more than one type at the same time, you may have more side effects. Give your health care provider a list of all medicines you use. Your doctor will tell you how much medicine to take. Do not take more medicine than directed. Call emergency for help if you have problems breathing.  The medicine will cause constipation. Try to have a bowel movement at least every 2 to 3 days. If you do not have a bowel movement for 3 days, call  your doctor or health care professional.  Do not take Tylenol (acetaminophen) or medicines that have acetaminophen with this medicine. Too much acetaminophen can be very dangerous. Many nonprescription medicines contain acetaminophen. Always read the labels carefully to avoid taking more acetaminophen.  What side effects may I notice from receiving this medicine?  Side effects that you should report to your doctor or health care professional as soon as possible:  -allergic reactions like skin rash, itching or hives, swelling of the face, lips, or tongue  -breathing difficulties, wheezing  -confusion  -light headedness or fainting spells  -severe stomach pain  -unusually weak or tired  -yellowing of the skin or the whites of the eyes  Side effects that usually do not require medical attention (report to your doctor or health care professional if they continue or are bothersome):  -dizziness  -drowsiness  -nausea  -vomiting  This list may not describe all possible side effects. Call your doctor for medical advice about side effects. You may report side effects to FDA at 9-919-FDA-5291.  Where should I keep my medicine?  Keep out of the reach of children. This medicine can be abused. Keep your medicine in a safe place to protect it from theft. Do not share this medicine with anyone. Selling or giving away this medicine is dangerous and against the law.  This medicine may cause accidental overdose and death if it taken by other adults, children, or pets. Mix any unused medicine with a substance like cat litter or coffee grounds. Then throw the medicine away in a sealed container like a sealed bag or a coffee can with a lid. Do not use the medicine after the expiration date.  Store at room temperature between 20 and 25 degrees C (68 and 77 degrees F).  NOTE: This sheet is a summary. It may not cover all possible information. If you have questions about this medicine, talk to your doctor, pharmacist, or health care  provider.     © 2016, [employer]vier/Gold Standard. (2015-11-18 15:18:46)            SYMPTOMS OF A STROKE    Call 911 or have someone take you to the Emergency Department if you have any of the following:    · Sudden numbness or weakness of your face, arm or leg especially on one side of the body  · Sudden confusion, diffiiculty speaking or trouble understanding   · Changes in your vision or loss of sight in one eye  · Sudden severe headache with no known cause  · sudden dizziness, trouble walking, loss of balance or coordination    It is important to seek emergency care right away if you have further stroke symptoms. If you get emergency help quickly, the powerful clot-dissolving medicines can reduce the disabilities caused by a stroke.     For more information:    American Stroke Association  2-511-1-STROKE  www.strokeassociation.org           IF YOU SMOKE OR USE TOBACCO PLEASE READ THE FOLLOWING:    Why is smoking bad for me?  Smoking increases the risk of heart disease, lung disease, vascular disease, stroke, and cancer.     If you smoke, STOP!    If you would like more information on quitting smoking, please visit the [employer] website: [email or web address]   This link will provide additional resources including the QUIT line and the [employer] the [employer] support groups.     For more information:    American Cancer Society  ([phone]    American Heart Association  1-816.135.5364               YOU ARE THE MOST IMPORTANT FACTOR IN YOUR RECOVERY.     Follow all instructions carefully.     I have reviewed my discharge instructions with my nurse, including the following information, if applicable:     Information about my illness and diagnosis   Follow up appointments (including lab draws)   Wound Care   Equipment Needs   Medications (new and continuing) along with side effects   Preventative information such as vaccines and smoking cessations   Diet   Pain   I know when to  contact my Doctor's office or seek emergency care      I want my nurse to describe the side effects of my medications: YES NO   If the answer is no, I understand the side effects of my medications: YES NO   My nurse described the side effects of my medications in a way that I could understand: YES NO   I have taken my personal belongings and my own medications with me at discharge: YES NO            I have received this information and my questions have been answered. I have discussed any concerns I see with this plan with the nurse or physician. I understand these instructions.    Signature of Patient or Responsible Person: _____________________________________    Date: _________________  Time: __________________    Signature of Healthcare Provider: _______________________________________  Date: _________________  Time: __________________

## 2017-01-25 NOTE — PLAN OF CARE
Problem: Patient Care Overview (Adult)  Goal: Plan of Care Review  Outcome: Ongoing (interventions implemented as appropriate)    01/25/17 1443 01/25/17 1624   Coping/Psychosocial Response Interventions   Plan Of Care Reviewed With patient;spouse --    Patient Care Overview   Progress --  progress toward functional goals as expected       Goal: Adult Individualization and Mutuality  Outcome: Outcome(s) achieved Date Met:  01/25/17 01/25/17 0830   Individualization   Patient Specific Preferences Epidural for pain. STS post delivery if possible. Breastfeeding.   Patient Specific Goals Pain less than 4 per verbal scale   Patient Specific Interventions Epidural before AROM   Mutuality/Individual Preferences   What Anxieties, Fears or Concerns Do You Have About Your Health or Care? Pain with epidural placement   What Questions Do You Have About Your Health or Care? None   What Information Would Help Us Give You More Personalized Care? None

## 2017-01-26 PROBLEM — O24.419 GESTATIONAL DIABETES MELLITUS, CLASS A2: Status: ACTIVE | Noted: 2017-01-26

## 2017-01-26 PROBLEM — K83.1 CHOLESTASIS: Status: ACTIVE | Noted: 2017-01-26

## 2017-01-26 LAB
BASOPHILS # BLD AUTO: 0.02 10*3/MM3 (ref 0–0.2)
BASOPHILS NFR BLD AUTO: 0.2 % (ref 0–1.5)
DEPRECATED RDW RBC AUTO: 47.5 FL (ref 37–54)
EOSINOPHIL # BLD AUTO: 0.09 10*3/MM3 (ref 0–0.7)
EOSINOPHIL NFR BLD AUTO: 0.9 % (ref 0.3–6.2)
ERYTHROCYTE [DISTWIDTH] IN BLOOD BY AUTOMATED COUNT: 14.1 % (ref 11.7–13)
GLUCOSE BLDC GLUCOMTR-MCNC: 71 MG/DL (ref 70–130)
HCT VFR BLD AUTO: 35.4 % (ref 35.6–45.5)
HGB BLD-MCNC: 11.6 G/DL (ref 11.9–15.5)
IMM GRANULOCYTES # BLD: 0.08 10*3/MM3 (ref 0–0.03)
IMM GRANULOCYTES NFR BLD: 0.8 % (ref 0–0.5)
LYMPHOCYTES # BLD AUTO: 1.87 10*3/MM3 (ref 0.9–4.8)
LYMPHOCYTES NFR BLD AUTO: 18 % (ref 19.6–45.3)
MCH RBC QN AUTO: 30.4 PG (ref 26.9–32)
MCHC RBC AUTO-ENTMCNC: 32.8 G/DL (ref 32.4–36.3)
MCV RBC AUTO: 92.9 FL (ref 80.5–98.2)
MONOCYTES # BLD AUTO: 0.98 10*3/MM3 (ref 0.2–1.2)
MONOCYTES NFR BLD AUTO: 9.4 % (ref 5–12)
NEUTROPHILS # BLD AUTO: 7.34 10*3/MM3 (ref 1.9–8.1)
NEUTROPHILS NFR BLD AUTO: 70.7 % (ref 42.7–76)
PLATELET # BLD AUTO: 152 10*3/MM3 (ref 140–500)
PMV BLD AUTO: 11.1 FL (ref 6–12)
RBC # BLD AUTO: 3.81 10*6/MM3 (ref 3.9–5.2)
WBC NRBC COR # BLD: 10.38 10*3/MM3 (ref 4.5–10.7)

## 2017-01-26 PROCEDURE — 85025 COMPLETE CBC W/AUTO DIFF WBC: CPT | Performed by: OBSTETRICS & GYNECOLOGY

## 2017-01-26 PROCEDURE — 82962 GLUCOSE BLOOD TEST: CPT

## 2017-01-26 RX ADMIN — DOCUSATE SODIUM 100 MG: 100 CAPSULE, LIQUID FILLED ORAL at 08:39

## 2017-01-26 RX ADMIN — URSODIOL 500 MG: 250 TABLET, FILM COATED ORAL at 08:39

## 2017-01-26 RX ADMIN — OXYCODONE HYDROCHLORIDE AND ACETAMINOPHEN 2 TABLET: 5; 325 TABLET ORAL at 06:57

## 2017-01-26 RX ADMIN — URSODIOL 500 MG: 250 TABLET, FILM COATED ORAL at 23:21

## 2017-01-26 RX ADMIN — IBUPROFEN 800 MG: 800 TABLET ORAL at 23:20

## 2017-01-26 RX ADMIN — IBUPROFEN 800 MG: 800 TABLET ORAL at 12:05

## 2017-01-26 RX ADMIN — DOCUSATE SODIUM 100 MG: 100 CAPSULE, LIQUID FILLED ORAL at 17:50

## 2017-01-26 RX ADMIN — URSODIOL 500 MG: 250 TABLET, FILM COATED ORAL at 17:59

## 2017-01-26 NOTE — PROGRESS NOTES
[facility]  Vaginal Delivery Progress Note    Patient Name: Khadra Royal  :  1975  MRN:  [id number]      Subjective   Postpartum Day 1: Vaginal Delivery of a male infant.     The patient feels well without complaints.  Her pain is well controlled.  Reports normal lochia.     The patient plans to breastfeed.    Objective     Vital Signs Range for the last 24 hours  Temperature: Temp:  [97.7 °F (36.5 °C)-98.8 °F (37.1 °C)] 98.4 °F (36.9 °C)       BP: BP: (105-176)/(56-97) 126/75   Pulse: Heart Rate:  [] 69   Respirations: Resp:  [16-20] 18                       Physical Exam:  General: Awake and alert  Abdomen: Fundus: firm, non tender, 1 below umbilicus  Extremities:  trace edema, NT     Labs:     Lab Results   Component Value Date    WBC 10.38 2017    HGB 11.6 (L) 2017    HCT 35.4 (L) 2017    MCV 92.9 2017     2017     Prenatal labs results reviewed:  Yes   Rubella:  Imune  Rh Status:    RH TYPE   Date Value Ref Range Status   2017 Positive  Final         Assessment/Plan     PPD1 S/P  -   GDMA2-- on insulin w/ pregnancy.  BS nl now, no insulin.  D/w f/u.    Cholestasis (chronic)-- no itching  Baby boy needs circ-- d/w  Doing well, continue usual cares.     Active Problems:    Cholestasis    Gestational diabetes mellitus, class A2          Linda Fernandes, RAYRAY  2017  9:59 AM   Patient seen and examined. Agree with above assessment.   Lidia Yancey MD  2017  12:43 PM

## 2017-01-26 NOTE — PLAN OF CARE
Problem: Postpartum, Vaginal Delivery (Adult)  Goal: Signs and Symptoms of Listed Potential Problems Will be Absent or Manageable (Postpartum, Vaginal Delivery)  Outcome: Ongoing (interventions implemented as appropriate)    17 1753   Postpartum, Vaginal Delivery   Problems Assessed (Postpartum Vaginal Delivery) all   Problems Present (Postpartum Vaginal Delivery) none         Problem: Breastfeeding (Adult,NICU,[place],Obstetrics,Pediatric)  Goal: Signs and Symptoms of Listed Potential Problems Will be Absent or Manageable (Breastfeeding)  Outcome: Ongoing (interventions implemented as appropriate)    17 1753   Breastfeeding   Problems Assessed (Breastfeeding) ineffective breastfeeding   Problems Present (Breastfeeding) ineffective breastfeeding  (sleepy at breast,using EBP,seen by lactation,assist as need)

## 2017-01-26 NOTE — PLAN OF CARE
Problem: Patient Care Overview (Adult)  Goal: Plan of Care Review  Outcome: Ongoing (interventions implemented as appropriate)    01/26/17 0552   Coping/Psychosocial Response Interventions   Plan Of Care Reviewed With patient   Patient Care Overview   Progress improving       Goal: Discharge Needs Assessment  Outcome: Ongoing (interventions implemented as appropriate)

## 2017-01-26 NOTE — LACTATION NOTE
This note was copied from a baby's chart.  Mom is still attempting to feed 36 weeker and he is very sleepy.  Using hand pump and eletric pump and getting very small amounts of colostrum and mom decided to supplement.

## 2017-01-26 NOTE — LACTATION NOTE
This note was copied from a baby's chart.  Assisted with deep latch baby with weak suck.  Suck training done.  Plan is to feed on demand and if baby remains sleepy hand pump and eletric pump and then supplement if continues to get little to no volume until baby is more consistant at breast.

## 2017-01-27 VITALS
HEART RATE: 62 BPM | WEIGHT: 169 LBS | BODY MASS INDEX: 31.1 KG/M2 | HEIGHT: 62 IN | SYSTOLIC BLOOD PRESSURE: 131 MMHG | OXYGEN SATURATION: 97 % | TEMPERATURE: 98.2 F | RESPIRATION RATE: 18 BRPM | DIASTOLIC BLOOD PRESSURE: 84 MMHG

## 2017-01-27 RX ORDER — IBUPROFEN 800 MG/1
800 TABLET ORAL EVERY 8 HOURS
Qty: 30 TABLET | Refills: 3 | Status: SHIPPED | OUTPATIENT
Start: 2017-01-27 | End: 2017-01-28

## 2017-01-27 RX ORDER — OXYCODONE HYDROCHLORIDE AND ACETAMINOPHEN 5; 325 MG/1; MG/1
2 TABLET ORAL EVERY 4 HOURS PRN
Qty: 24 TABLET | Refills: 0 | Status: SHIPPED | OUTPATIENT
Start: 2017-01-27 | End: 2017-01-29

## 2017-01-27 RX ADMIN — URSODIOL 500 MG: 250 TABLET, FILM COATED ORAL at 09:46

## 2017-01-27 RX ADMIN — DOCUSATE SODIUM 100 MG: 100 CAPSULE, LIQUID FILLED ORAL at 09:46

## 2017-01-27 RX ADMIN — IBUPROFEN 800 MG: 800 TABLET ORAL at 09:46

## 2017-01-27 NOTE — PLAN OF CARE
Problem: Patient Care Overview (Adult)  Goal: Plan of Care Review  Outcome: Ongoing (interventions implemented as appropriate)    17 0237   Coping/Psychosocial Response Interventions   Plan Of Care Reviewed With patient   Patient Care Overview   Progress improving   Outcome Evaluation   Outcome Summary/Follow up Plan Nursing beginning to improve, continues to pump and and supplement, Motrin for pain, fundus firm, scant lochia       Goal: Discharge Needs Assessment  Outcome: Ongoing (interventions implemented as appropriate)    Problem: Postpartum, Vaginal Delivery (Adult)  Goal: Signs and Symptoms of Listed Potential Problems Will be Absent or Manageable (Postpartum, Vaginal Delivery)  Outcome: Ongoing (interventions implemented as appropriate)    Problem: Breastfeeding (Adult,NICU,,Obstetrics,Pediatric)  Goal: Signs and Symptoms of Listed Potential Problems Will be Absent or Manageable (Breastfeeding)  Outcome: Ongoing (interventions implemented as appropriate)

## 2017-01-27 NOTE — LACTATION NOTE
This note was copied from a baby's chart.  LC follow-up at patient request. Puts baby to breast but does not stress if he doesn't feed well . Using HGP but concerned that she is not getting volume. Observed flange fit and enc mom to increase suction as tolerable. Suggested Lanolin for lubrication also.

## 2017-01-27 NOTE — DISCHARGE SUMMARY
Date of Discharge:  2017    Discharge Diagnosis: vaginal delivery    Presenting Problem/History of Present Illness  Pregnancy [Z33.1]       Hospital Course  Patient is a 41 y.o. female presented for term IOL d/t gest DM requiring insulin and chronic cholestasis.  Delivered viable male infant per Dr. Acosta.  Baby boy still needs circ.  BS have remained stable and she is aware of need for f/u.  No pp complications.  Ready for d/c.      Procedures Performed         Consults:   Consults     No orders found from 2016 to 2017.          Condition on Discharge:   Subjective   Postpartum Day 2 Vaginal Delivery.    The patient feels well without complaints.    Vital Signs  Temp:  [97.7 °F (36.5 °C)-98.2 °F (36.8 °C)] 98.2 °F (36.8 °C)  Heart Rate:  [62-65] 62  Resp:  [18] 18  BP: (117-131)/(60-84) 131/84    Physical Exam:   General: Awake and alert   Abdomen: Fundus: firm, non tender    Extremities:  Calves NT bilaterally    Assessment/Plan     PPD2  S/P  -   Stable for discharge. Instructions reviewed      Discharge Disposition  Home or Self Care    Discharge Medications   Khadra Royal   Home Medication Instructions BERTIN:[id number]    Printed on:17 0854   Medication Information                      cholecalciferol (VITAMIN D3) 1000 UNITS tablet  Take 2,000 Units by mouth Daily.             Docosahexaenoic Acid (DHA ALGAL-900) 300 MG capsule  Take  by mouth.             docusate sodium (COLACE) 100 MG capsule  Take 100 mg by mouth 2 (Two) Times a Day.             ibuprofen (ADVIL,MOTRIN) 800 MG tablet  Take 1 tablet by mouth Every 8 (Eight) Hours for 1 day.             oxyCODONE-acetaminophen (PERCOCET) 5-325 MG per tablet  Take 2 tablets by mouth Every 4 (Four) Hours As Needed for severe pain (7-10) for up to 2 days.             Prenatal Vit-Fe Fumarate-FA (PRENATAL, CLASSIC, VITAMIN) 28-0.8 MG tablet tablet  Take 1 tablet by mouth Daily.             ursodiol (ACTIGALL) 250 MG tablet  Take  500 mg by mouth 3 (Three) Times a Day.                   Activity at Discharge:     Follow-up Appointments  No future appointments.  Referrals and Follow-ups to Schedule     Call MD With Problems / Concerns    As directed    Instructions:  call for fever, increased vaginal bleeding or pain, any other concerns                 Test Results Pending at Discharge       Linda Fernandes, RAYRAY  01/27/17  8:54 AM

## 2017-02-13 ENCOUNTER — TELEPHONE (OUTPATIENT)
Dept: LACTATION | Facility: HOSPITAL | Age: 42
End: 2017-02-13

## 2019-01-25 ENCOUNTER — APPOINTMENT (OUTPATIENT)
Dept: WOMENS IMAGING | Facility: HOSPITAL | Age: 44
End: 2019-01-25

## 2019-01-25 PROCEDURE — 77063 BREAST TOMOSYNTHESIS BI: CPT | Performed by: RADIOLOGY

## 2019-01-25 PROCEDURE — 77067 SCR MAMMO BI INCL CAD: CPT | Performed by: RADIOLOGY

## 2020-03-09 ENCOUNTER — APPOINTMENT (OUTPATIENT)
Dept: WOMENS IMAGING | Facility: HOSPITAL | Age: 45
End: 2020-03-09

## 2020-03-09 PROCEDURE — 77063 BREAST TOMOSYNTHESIS BI: CPT | Performed by: RADIOLOGY

## 2020-03-09 PROCEDURE — 77067 SCR MAMMO BI INCL CAD: CPT | Performed by: RADIOLOGY

## 2021-03-18 ENCOUNTER — APPOINTMENT (OUTPATIENT)
Dept: WOMENS IMAGING | Facility: HOSPITAL | Age: 46
End: 2021-03-18

## 2021-03-18 PROCEDURE — 77067 SCR MAMMO BI INCL CAD: CPT | Performed by: RADIOLOGY

## 2021-03-18 PROCEDURE — 77063 BREAST TOMOSYNTHESIS BI: CPT | Performed by: RADIOLOGY

## 2021-04-06 ENCOUNTER — BULK ORDERING (OUTPATIENT)
Dept: CASE MANAGEMENT | Facility: OTHER | Age: 46
End: 2021-04-06

## 2021-04-06 DIAGNOSIS — Z23 IMMUNIZATION DUE: ICD-10-CM

## 2022-03-30 ENCOUNTER — APPOINTMENT (OUTPATIENT)
Dept: WOMENS IMAGING | Facility: HOSPITAL | Age: 47
End: 2022-03-30

## 2022-03-30 PROCEDURE — 77063 BREAST TOMOSYNTHESIS BI: CPT | Performed by: RADIOLOGY

## 2022-03-30 PROCEDURE — 77067 SCR MAMMO BI INCL CAD: CPT | Performed by: RADIOLOGY
